# Patient Record
Sex: MALE | Race: AMERICAN INDIAN OR ALASKA NATIVE | NOT HISPANIC OR LATINO | Employment: UNEMPLOYED | URBAN - METROPOLITAN AREA
[De-identification: names, ages, dates, MRNs, and addresses within clinical notes are randomized per-mention and may not be internally consistent; named-entity substitution may affect disease eponyms.]

---

## 2018-12-05 ENCOUNTER — APPOINTMENT (OUTPATIENT)
Dept: RADIOLOGY | Facility: MEDICAL CENTER | Age: 33
End: 2018-12-05
Attending: EMERGENCY MEDICINE
Payer: COMMERCIAL

## 2018-12-05 ENCOUNTER — HOSPITAL ENCOUNTER (EMERGENCY)
Facility: MEDICAL CENTER | Age: 33
End: 2018-12-06
Attending: EMERGENCY MEDICINE
Payer: COMMERCIAL

## 2018-12-05 DIAGNOSIS — F10.920 ALCOHOLIC INTOXICATION WITHOUT COMPLICATION (HCC): ICD-10-CM

## 2018-12-05 DIAGNOSIS — R51.9 NONINTRACTABLE HEADACHE, UNSPECIFIED CHRONICITY PATTERN, UNSPECIFIED HEADACHE TYPE: ICD-10-CM

## 2018-12-05 LAB
BASOPHILS # BLD AUTO: 0.6 % (ref 0–1.8)
BASOPHILS # BLD: 0.04 K/UL (ref 0–0.12)
EOSINOPHIL # BLD AUTO: 0.34 K/UL (ref 0–0.51)
EOSINOPHIL NFR BLD: 5.4 % (ref 0–6.9)
ERYTHROCYTE [DISTWIDTH] IN BLOOD BY AUTOMATED COUNT: 43.2 FL (ref 35.9–50)
HCT VFR BLD AUTO: 44.9 % (ref 42–52)
HGB BLD-MCNC: 15.5 G/DL (ref 14–18)
IMM GRANULOCYTES # BLD AUTO: 0.02 K/UL (ref 0–0.11)
IMM GRANULOCYTES NFR BLD AUTO: 0.3 % (ref 0–0.9)
LYMPHOCYTES # BLD AUTO: 1.89 K/UL (ref 1–4.8)
LYMPHOCYTES NFR BLD: 29.8 % (ref 22–41)
MCH RBC QN AUTO: 31.1 PG (ref 27–33)
MCHC RBC AUTO-ENTMCNC: 34.5 G/DL (ref 33.7–35.3)
MCV RBC AUTO: 90 FL (ref 81.4–97.8)
MONOCYTES # BLD AUTO: 0.54 K/UL (ref 0–0.85)
MONOCYTES NFR BLD AUTO: 8.5 % (ref 0–13.4)
NEUTROPHILS # BLD AUTO: 3.51 K/UL (ref 1.82–7.42)
NEUTROPHILS NFR BLD: 55.4 % (ref 44–72)
NRBC # BLD AUTO: 0 K/UL
NRBC BLD-RTO: 0 /100 WBC
PLATELET # BLD AUTO: 254 K/UL (ref 164–446)
PMV BLD AUTO: 10.5 FL (ref 9–12.9)
RBC # BLD AUTO: 4.99 M/UL (ref 4.7–6.1)
WBC # BLD AUTO: 6.3 K/UL (ref 4.8–10.8)

## 2018-12-05 PROCEDURE — 83690 ASSAY OF LIPASE: CPT

## 2018-12-05 PROCEDURE — 80053 COMPREHEN METABOLIC PANEL: CPT

## 2018-12-05 PROCEDURE — 99285 EMERGENCY DEPT VISIT HI MDM: CPT

## 2018-12-05 PROCEDURE — 85025 COMPLETE CBC W/AUTO DIFF WBC: CPT

## 2018-12-05 RX ORDER — ONDANSETRON 2 MG/ML
4 INJECTION INTRAMUSCULAR; INTRAVENOUS ONCE
Status: COMPLETED | OUTPATIENT
Start: 2018-12-06 | End: 2018-12-06

## 2018-12-06 VITALS — BODY MASS INDEX: 41.75 KG/M2 | WEIGHT: 315 LBS | RESPIRATION RATE: 25 BRPM | TEMPERATURE: 99.5 F | HEIGHT: 73 IN

## 2018-12-06 LAB
ALBUMIN SERPL BCP-MCNC: 4.1 G/DL (ref 3.2–4.9)
ALBUMIN/GLOB SERPL: 1.2 G/DL
ALP SERPL-CCNC: 89 U/L (ref 30–99)
ALT SERPL-CCNC: 26 U/L (ref 2–50)
ANION GAP SERPL CALC-SCNC: 10 MMOL/L (ref 0–11.9)
AST SERPL-CCNC: 17 U/L (ref 12–45)
BILIRUB SERPL-MCNC: 0.3 MG/DL (ref 0.1–1.5)
BUN SERPL-MCNC: 9 MG/DL (ref 8–22)
CALCIUM SERPL-MCNC: 8.6 MG/DL (ref 8.5–10.5)
CHLORIDE SERPL-SCNC: 112 MMOL/L (ref 96–112)
CO2 SERPL-SCNC: 22 MMOL/L (ref 20–33)
CREAT SERPL-MCNC: 0.64 MG/DL (ref 0.5–1.4)
GLOBULIN SER CALC-MCNC: 3.4 G/DL (ref 1.9–3.5)
GLUCOSE SERPL-MCNC: 120 MG/DL (ref 65–99)
LIPASE SERPL-CCNC: 14 U/L (ref 11–82)
POTASSIUM SERPL-SCNC: 3.8 MMOL/L (ref 3.6–5.5)
PROT SERPL-MCNC: 7.5 G/DL (ref 6–8.2)
SODIUM SERPL-SCNC: 144 MMOL/L (ref 135–145)

## 2018-12-06 PROCEDURE — 700102 HCHG RX REV CODE 250 W/ 637 OVERRIDE(OP): Performed by: EMERGENCY MEDICINE

## 2018-12-06 PROCEDURE — 96374 THER/PROPH/DIAG INJ IV PUSH: CPT

## 2018-12-06 PROCEDURE — 700111 HCHG RX REV CODE 636 W/ 250 OVERRIDE (IP): Performed by: EMERGENCY MEDICINE

## 2018-12-06 PROCEDURE — A9270 NON-COVERED ITEM OR SERVICE: HCPCS | Performed by: EMERGENCY MEDICINE

## 2018-12-06 PROCEDURE — 70450 CT HEAD/BRAIN W/O DYE: CPT

## 2018-12-06 RX ORDER — ONDANSETRON 4 MG/1
4 TABLET, ORALLY DISINTEGRATING ORAL EVERY 6 HOURS PRN
Qty: 20 TAB | Refills: 0 | Status: SHIPPED | OUTPATIENT
Start: 2018-12-06 | End: 2021-07-07

## 2018-12-06 RX ORDER — ACETAMINOPHEN 325 MG/1
650 TABLET ORAL ONCE
Status: COMPLETED | OUTPATIENT
Start: 2018-12-06 | End: 2018-12-06

## 2018-12-06 RX ADMIN — ONDANSETRON 4 MG: 2 INJECTION INTRAMUSCULAR; INTRAVENOUS at 00:21

## 2018-12-06 RX ADMIN — ACETAMINOPHEN 650 MG: 325 TABLET, FILM COATED ORAL at 01:50

## 2018-12-06 ASSESSMENT — LIFESTYLE VARIABLES
TOTAL SCORE: 0
CONSUMPTION TOTAL: POSITIVE
TOTAL SCORE: 0
ON A TYPICAL DAY WHEN YOU DRINK ALCOHOL HOW MANY DRINKS DO YOU HAVE: 20
TOTAL SCORE: 0
HAVE PEOPLE ANNOYED YOU BY CRITICIZING YOUR DRINKING: NO
HOW MANY TIMES IN THE PAST YEAR HAVE YOU HAD 5 OR MORE DRINKS IN A DAY: 365
AVERAGE NUMBER OF DAYS PER WEEK YOU HAVE A DRINK CONTAINING ALCOHOL: 7
EVER HAD A DRINK FIRST THING IN THE MORNING TO STEADY YOUR NERVES TO GET RID OF A HANGOVER: NO
EVER FELT BAD OR GUILTY ABOUT YOUR DRINKING: NO
HAVE YOU EVER FELT YOU SHOULD CUT DOWN ON YOUR DRINKING: NO
DO YOU DRINK ALCOHOL: YES

## 2018-12-06 NOTE — ED TRIAGE NOTES
Chief Complaint   Patient presents with   • Loss of Consciousness     pt bib ems from home pt was found down by his family for no longer than 5 minutes. Pt complains of severe headache. Pt states he has never had a headache before. Pt endorses drinking dwayne 20 beers today since 1100hrs this morning. Pt is 0.154 on breathalyzer. FSBG 146   • Headache   • Alcohol Intoxication

## 2018-12-06 NOTE — ED NOTES
Pt Given discharge instructions/ prescriptions/ home care instructions, Pt verbalized understanding of instructions given, pt ambulatory to ER lobby with friends. IV removed. Dressing placed, No bleeding noted.

## 2018-12-06 NOTE — ED NOTES
Pt complaining of being thirsty. Pt educated that he could not have anything by mouth until CT was complete. Pt verbalized understanding.

## 2018-12-06 NOTE — ED NOTES
Pt resting comfortably in bed. Monitors in place VSS. No s/s of distress noted. Will continue to monitor.

## 2018-12-06 NOTE — ED PROVIDER NOTES
ED Provider Note    ER PROVIDER NOTE        CHIEF COMPLAINT  Chief Complaint   Patient presents with   • Loss of Consciousness     pt bib ems from home pt was found down by his family for no longer than 5 minutes. Pt complains of severe headache. Pt states he has never had a headache before. Pt endorses drinking dwayne 20 beers today since 1100hrs this morning. Pt is 0.154 on breathalyzer. FSBG 146   • Headache   • Alcohol Intoxication       HPI  Nik Wasserman is a 33 y.o. male who presents to the emergency department complaining of headache.  Patient reports he has been drinking since this morning, approximately 1 hour ago he developed severe headache.  States he has not had a headache like this before, seem to be fairly abrupt in onset.  Through the evening he has had some nausea with an upper as well, denies any abdominal pain.  No chest pain.  No fevers or chills or neck pain.  He denies any focal weakness numbness or tingling.  He denies any visual symptoms.  Apparently earlier in the evening his family found as well    REVIEW OF SYSTEMS  Pertinent positives include headache. Pertinent negatives include no neck pain. See HPI for details. All other systems reviewed and are negative.    PAST MEDICAL HISTORY   None    SURGICAL HISTORY  patient denies any surgical history    FAMILY HISTORY  No family history on file.    SOCIAL HISTORY  Social History     Social History   • Marital status: N/A     Spouse name: N/A   • Number of children: N/A   • Years of education: N/A     Social History Main Topics   • Smoking status: Not on file   • Smokeless tobacco: Not on file   • Alcohol use Not on file   • Drug use: Unknown   • Sexual activity: Not on file     Other Topics Concern   • Not on file     Social History Narrative   • No narrative on file      History   Drug use: Unknown       CURRENT MEDICATIONS  Home Medications    **Home medications have not yet been reviewed for this encounter**         ALLERGIES  No Known  "Allergies    PHYSICAL EXAM  VITAL SIGNS: Temp 37.5 °C (99.5 °F) (Temporal)   Ht 1.854 m (6' 1\")   Wt (!) 154.2 kg (340 lb)   BMI 44.86 kg/m²   Pulse ox interpretation: I interpret this pulse ox as normal.    Constitutional: Alert in no apparent distress, smells of alcohol, unkempt  HENT: No signs of trauma, Bilateral external ears normal, Nose normal.   Eyes: Pupils are equal and reactive, Conjunctiva normal, Non-icteric.   Neck: Normal range of motion, No tenderness, Supple, No stridor.   Lymphatic: No lymphadenopathy noted.   Cardiovascular: Regular rate and rhythm, no murmurs.   Thorax & Lungs: Normal breath sounds, No respiratory distress, No wheezing, No chest tenderness.   Abdomen: Bowel sounds normal, Soft, No tenderness, No masses, No pulsatile masses. No peritoneal signs.  Skin: Warm, Dry, No erythema, No rash.   Back: No bony tenderness, No CVA tenderness.   Extremities: Intact distal pulses, No edema, No tenderness, No cyanosis, Negative Mindi's sign.  Musculoskeletal: Good range of motion in all major joints. No tenderness to palpation or major deformities noted.   Neurologic: Alert, bilateral horizontal nystagmus, slurred speech, cranial nerves intact,upper extremities bilaterally exhibit no drift, no dysmetria, 5 out of 5 strength with bilateral bicep/tricep/, sensation intact to light touch throughout upper extremities. Lower extremities strength 5 out of 5 thigh extension/flexion/abduction/adduction, knee extension/flexion, dorsiflexion plantar flexion. No clonus.  2+ patella reflexes.  sensation intact to light touch.  No focal deficits noted.Psychiatric: Affect normal, Judgment normal, Mood normal.       DIAGNOSTIC STUDIES / PROCEDURES        LABS  Labs Reviewed   COMP METABOLIC PANEL - Abnormal; Notable for the following:        Result Value    Glucose 120 (*)     All other components within normal limits   CBC WITH DIFFERENTIAL   LIPASE   ESTIMATED GFR       All labs reviewed by " me.    RADIOLOGY  CT-HEAD W/O   Final Result      No evidence of acute intracranial process.        The radiologist's interpretation of all radiological studies have been reviewed by me.    COURSE & MEDICAL DECISION MAKING  Nursing notes, VS, PMSFHx reviewed in chart.    11:21 PM Patient seen and examined at bedside. Patient will be treated with Zofran. Ordered for cbc, cmp, lipase, CT to evaluate his symptoms.     2:09 AM  Patient reevaluated, feeling improved.  Tolerating p.o. well, and relates with steady gait, plan for discharge            Decision Making:  This is a 33 y.o. male presenting with headache in the setting of alcohol use.  At this time I detect no emergent process.  As patient did have abrupt onset of his headache, CT scan was obtained and there is no evidence of subarachnoid or other intracranial bleed.  Given that onset of symptoms less than 6 hours with a negative CT essentially ruling out this diagnosis. the normal vital signs, lack of a temperature and lack of meningeal signs (supple neck without pain with rom) makes the dx of meningitis less likely.   The patient has no neurologic signs, no fever, and is immunocompetent therefore the time course would be inconsistent with abscess. The absence of neurologic signs and the short duration of sx make neoplasm unlikely.   The patient is improved with normal vitals, a normal neurologic exam, normal gait, and is discharged home with pcp follow-up and return precautions.      The patient will return for new or worsening symptoms and is stable at the time of discharge.    The patient is referred to a primary physician for blood pressure management, diabetic screening, and for all other preventative health concerns.        DISPOSITION:  Patient will be discharged home in stable condition.    FOLLOW UP:  [address] [address]  [phone]          OUTPATIENT MEDICATIONS:  New Prescriptions    ONDANSETRON (ZOFRAN  ODT) 4 MG TABLET DISPERSIBLE    Take 1 Tab by mouth every 6 hours as needed for Nausea.         FINAL IMPRESSION  1. Alcoholic intoxication without complication (HCC)    2. Nonintractable headache, unspecified chronicity pattern, unspecified headache type          The note accurately reflects work and decisions made by me.  Hima Kaminski  12/6/2018  2:11 AM

## 2021-06-26 ENCOUNTER — HOSPITAL ENCOUNTER (EMERGENCY)
Facility: MEDICAL CENTER | Age: 36
End: 2021-06-26
Attending: EMERGENCY MEDICINE
Payer: COMMERCIAL

## 2021-06-26 VITALS
HEART RATE: 72 BPM | BODY MASS INDEX: 41.75 KG/M2 | OXYGEN SATURATION: 98 % | WEIGHT: 315 LBS | RESPIRATION RATE: 18 BRPM | SYSTOLIC BLOOD PRESSURE: 146 MMHG | HEIGHT: 73 IN | DIASTOLIC BLOOD PRESSURE: 108 MMHG | TEMPERATURE: 96.5 F

## 2021-06-26 DIAGNOSIS — L03.031 CELLULITIS OF TOE OF RIGHT FOOT: ICD-10-CM

## 2021-06-26 DIAGNOSIS — T14.8XXA WOUND INFECTION: ICD-10-CM

## 2021-06-26 DIAGNOSIS — L08.9 WOUND INFECTION: ICD-10-CM

## 2021-06-26 LAB — GLUCOSE BLD-MCNC: 87 MG/DL (ref 65–99)

## 2021-06-26 PROCEDURE — 99283 EMERGENCY DEPT VISIT LOW MDM: CPT

## 2021-06-26 PROCEDURE — 82962 GLUCOSE BLOOD TEST: CPT

## 2021-06-26 RX ORDER — SULFAMETHOXAZOLE AND TRIMETHOPRIM 800; 160 MG/1; MG/1
1 TABLET ORAL 2 TIMES DAILY
Qty: 20 TABLET | Refills: 0 | Status: SHIPPED | OUTPATIENT
Start: 2021-06-26 | End: 2021-07-06

## 2021-06-26 NOTE — ED NOTES
Discharge instructions and prescriptions discussed with pt. Pt verbalized understanding. Pt discharged ambulatory with family.

## 2021-06-26 NOTE — ED PROVIDER NOTES
ED Provider Note    CHIEF COMPLAINT  Chief Complaint   Patient presents with   • Wound Check     pt states having foot sores that started 4 days ago. present on pts bilateral feet. sores are dry and look like they are healing.        HPI  Nik Wasserman is a 35 y.o. male who presents for evaluation of wound check on the bilateral feet.  Patient has no stated medical or surgical history.  He is not a known diabetic.  He developed some open sores on his feet around 4 to 5 days ago.  Some of them are oozing some slightly purulent drainage.  No associated trauma no fevers or chills.  No other symptoms reported    REVIEW OF SYSTEMS  See HPI for further details.  No night sweats weight loss numbness tingling weakness rash no stated medical history all other systems are negative.     PAST MEDICAL HISTORY  No past medical history on file.  No stated medical history  FAMILY HISTORY  Noncontributory    SOCIAL HISTORY  Social History     Socioeconomic History   • Marital status: Single     Spouse name: Not on file   • Number of children: Not on file   • Years of education: Not on file   • Highest education level: Not on file   Occupational History   • Not on file   Tobacco Use   • Smoking status: Never Smoker   • Smokeless tobacco: Never Used   Vaping Use   • Vaping Use: Some days   • Substances: smokes wax?   Substance and Sexual Activity   • Alcohol use: Yes   • Drug use: Yes     Comment: pt smoked meth yesterday   • Sexual activity: Not on file   Other Topics Concern   • Not on file   Social History Narrative   • Not on file     Social Determinants of Health     Financial Resource Strain:    • Difficulty of Paying Living Expenses:    Food Insecurity:    • Worried About Running Out of Food in the Last Year:    • Ran Out of Food in the Last Year:    Transportation Needs:    • Lack of Transportation (Medical):    • Lack of Transportation (Non-Medical):    Physical Activity:    • Days of Exercise per Week:    • Minutes of  "Exercise per Session:    Stress:    • Feeling of Stress :    Social Connections:    • Frequency of Communication with Friends and Family:    • Frequency of Social Gatherings with Friends and Family:    • Attends [employer] Services:    • Active Member of Clubs or Organizations:    • Attends Club or Organization Meetings:    • Marital Status:    Intimate Partner Violence:    • Fear of Current or Ex-Partner:    • Emotionally Abused:    • Physically Abused:    • Sexually Abused:        SURGICAL HISTORY  No past surgical history on file.    CURRENT MEDICATIONS  Home Medications     Reviewed by Ginette Reese R.N. (Registered Nurse) on 06/26/21 at 1343  Med List Status: Complete   Medication Last Dose Status   ondansetron (ZOFRAN ODT) 4 MG TABLET DISPERSIBLE  Active                ALLERGIES  No Known Allergies    PHYSICAL EXAM  VITAL SIGNS: /108   Pulse 72   Temp 35.8 °C (96.5 °F) (Temporal)   Resp 18   Ht 1.854 m (6' 1\")   Wt (!) 166 kg (365 lb 1.3 oz)   SpO2 98%   BMI 48.17 kg/m²       Constitutional: Well developed, Well nourished, No acute distress, Non-toxic appearance.   HENT: Normocephalic, Atraumatic, Bilateral external ears normal, Oropharynx moist, No oral exudates, Nose normal.   Eyes: PERRLA, EOMI, Conjunctiva normal, No discharge.   Neck: Normal range of motion, No tenderness, Supple, No stridor.   Cardiovascular: Normal heart rate, Normal rhythm, No murmurs, No rubs, No gallops.   Thorax & Lungs: Normal breath sounds, No respiratory distress, No wheezing, No chest tenderness.   Abdomen: Bowel sounds normal, Soft, No tenderness, No masses, No pulsatile masses.   Extremities: I patient has some open sores on the bilateral feet primarily between the first and second toes in the webspace on both sides.  There is no abscess.  No necrosis   neurologic: Alert & oriented x 3, Normal motor function, Normal sensory function, No focal deficits noted.   Psychiatric: Affect normal, Judgment normal, Mood " normal.     Accu-Chek is normal at 87    COURSE & MEDICAL DECISION MAKING  Pertinent Labs & Imaging studies reviewed. (See chart for details)  Patient presents here with some open wounds that do appear to have some superficial infection.  I will provide him with several packets of possible antibiotic ointment and I counseled him to clean his feet at least once or twice a day pat dry with a clean towel apply antibiotic ointment and then put clean cotton socks over them.  I will start him on a 10-day course of Bactrim as well    FINAL IMPRESSION  1.  Bilateral foot infections         Electronically signed by: Ye Tellez M.D., 6/26/2021 2:42 PM

## 2021-06-26 NOTE — ED TRIAGE NOTES
"Chief Complaint   Patient presents with   • Wound Check     pt states having foot sores that started 4 days ago. present on pts bilateral feet. sores are dry and look like they are healing.        Pt BIB EMS from [facility] where pt couldn't be seen due to insurance. Pt states he has a mouth sore as well, but does not remember when it started. Pt aox4, GCS 15, speaking full sentences. Educated pt on triage process and to notify if there is any change.       /108   Pulse 72   Temp 35.8 °C (96.5 °F) (Temporal)   Resp 18   Ht 1.854 m (6' 1\")   Wt (!) 166 kg (365 lb 1.3 oz)   SpO2 98%   BMI 48.17 kg/m²     "

## 2021-07-07 ENCOUNTER — APPOINTMENT (OUTPATIENT)
Dept: RADIOLOGY | Facility: MEDICAL CENTER | Age: 36
DRG: 917 | End: 2021-07-07
Attending: EMERGENCY MEDICINE
Payer: COMMERCIAL

## 2021-07-07 ENCOUNTER — HOSPITAL ENCOUNTER (INPATIENT)
Facility: MEDICAL CENTER | Age: 36
LOS: 3 days | DRG: 917 | End: 2021-07-11
Attending: EMERGENCY MEDICINE | Admitting: STUDENT IN AN ORGANIZED HEALTH CARE EDUCATION/TRAINING PROGRAM
Payer: COMMERCIAL

## 2021-07-07 DIAGNOSIS — R06.00 DYSPNEA, UNSPECIFIED TYPE: ICD-10-CM

## 2021-07-07 DIAGNOSIS — I50.9 ACUTE CONGESTIVE HEART FAILURE, UNSPECIFIED HEART FAILURE TYPE (HCC): ICD-10-CM

## 2021-07-07 LAB
ANION GAP SERPL CALC-SCNC: 11 MMOL/L (ref 7–16)
BASE EXCESS BLDV CALC-SCNC: -8 MMOL/L
BASOPHILS # BLD AUTO: 0.4 % (ref 0–1.8)
BASOPHILS # BLD: 0.05 K/UL (ref 0–0.12)
BODY TEMPERATURE: ABNORMAL CENTIGRADE
BUN SERPL-MCNC: 35 MG/DL (ref 8–22)
CALCIUM SERPL-MCNC: 8.4 MG/DL (ref 8.5–10.5)
CHLORIDE SERPL-SCNC: 112 MMOL/L (ref 96–112)
CO2 SERPL-SCNC: 17 MMOL/L (ref 20–33)
CREAT SERPL-MCNC: 1.87 MG/DL (ref 0.5–1.4)
EKG IMPRESSION: NORMAL
EOSINOPHIL # BLD AUTO: 0.03 K/UL (ref 0–0.51)
EOSINOPHIL NFR BLD: 0.2 % (ref 0–6.9)
ERYTHROCYTE [DISTWIDTH] IN BLOOD BY AUTOMATED COUNT: 44.5 FL (ref 35.9–50)
GLUCOSE SERPL-MCNC: 134 MG/DL (ref 65–99)
HCO3 BLDV-SCNC: 17 MMOL/L (ref 24–28)
HCT VFR BLD AUTO: 38.7 % (ref 42–52)
HGB BLD-MCNC: 12.3 G/DL (ref 14–18)
IMM GRANULOCYTES # BLD AUTO: 0.06 K/UL (ref 0–0.11)
IMM GRANULOCYTES NFR BLD AUTO: 0.5 % (ref 0–0.9)
LYMPHOCYTES # BLD AUTO: 0.7 K/UL (ref 1–4.8)
LYMPHOCYTES NFR BLD: 5.5 % (ref 22–41)
MCH RBC QN AUTO: 29.3 PG (ref 27–33)
MCHC RBC AUTO-ENTMCNC: 31.8 G/DL (ref 33.7–35.3)
MCV RBC AUTO: 92.1 FL (ref 81.4–97.8)
MONOCYTES # BLD AUTO: 0.29 K/UL (ref 0–0.85)
MONOCYTES NFR BLD AUTO: 2.3 % (ref 0–13.4)
NEUTROPHILS # BLD AUTO: 11.53 K/UL (ref 1.82–7.42)
NEUTROPHILS NFR BLD: 91.1 % (ref 44–72)
NRBC # BLD AUTO: 0 K/UL
NRBC BLD-RTO: 0 /100 WBC
NT-PROBNP SERPL IA-MCNC: 9418 PG/ML (ref 0–125)
PCO2 BLDV: 34.7 MMHG (ref 41–51)
PH BLDV: 7.32 [PH] (ref 7.31–7.45)
PLATELET # BLD AUTO: 276 K/UL (ref 164–446)
PMV BLD AUTO: 10.5 FL (ref 9–12.9)
PO2 BLDV: 72.8 MMHG (ref 25–40)
POTASSIUM SERPL-SCNC: 4.7 MMOL/L (ref 3.6–5.5)
RBC # BLD AUTO: 4.2 M/UL (ref 4.7–6.1)
SAO2 % BLDV: 93.1 %
SODIUM SERPL-SCNC: 140 MMOL/L (ref 135–145)
TROPONIN T SERPL-MCNC: 38 NG/L (ref 6–19)
WBC # BLD AUTO: 12.7 K/UL (ref 4.8–10.8)

## 2021-07-07 PROCEDURE — 84484 ASSAY OF TROPONIN QUANT: CPT

## 2021-07-07 PROCEDURE — 83880 ASSAY OF NATRIURETIC PEPTIDE: CPT

## 2021-07-07 PROCEDURE — 36415 COLL VENOUS BLD VENIPUNCTURE: CPT

## 2021-07-07 PROCEDURE — 93005 ELECTROCARDIOGRAM TRACING: CPT | Performed by: EMERGENCY MEDICINE

## 2021-07-07 PROCEDURE — 71045 X-RAY EXAM CHEST 1 VIEW: CPT

## 2021-07-07 PROCEDURE — 5A09357 ASSISTANCE WITH RESPIRATORY VENTILATION, LESS THAN 24 CONSECUTIVE HOURS, CONTINUOUS POSITIVE AIRWAY PRESSURE: ICD-10-PCS | Performed by: EMERGENCY MEDICINE

## 2021-07-07 PROCEDURE — 700117 HCHG RX CONTRAST REV CODE 255: Performed by: EMERGENCY MEDICINE

## 2021-07-07 PROCEDURE — 80048 BASIC METABOLIC PNL TOTAL CA: CPT

## 2021-07-07 PROCEDURE — 85025 COMPLETE CBC W/AUTO DIFF WBC: CPT

## 2021-07-07 PROCEDURE — 82803 BLOOD GASES ANY COMBINATION: CPT

## 2021-07-07 PROCEDURE — 96374 THER/PROPH/DIAG INJ IV PUSH: CPT

## 2021-07-07 PROCEDURE — 94660 CPAP INITIATION&MGMT: CPT

## 2021-07-07 PROCEDURE — 99285 EMERGENCY DEPT VISIT HI MDM: CPT

## 2021-07-07 PROCEDURE — 96372 THER/PROPH/DIAG INJ SC/IM: CPT

## 2021-07-07 PROCEDURE — 96375 TX/PRO/DX INJ NEW DRUG ADDON: CPT

## 2021-07-07 PROCEDURE — 71275 CT ANGIOGRAPHY CHEST: CPT

## 2021-07-07 RX ORDER — ACETAMINOPHEN 500 MG
500-1000 TABLET ORAL EVERY 6 HOURS PRN
COMMUNITY

## 2021-07-07 RX ORDER — IPRATROPIUM BROMIDE AND ALBUTEROL SULFATE 2.5; .5 MG/3ML; MG/3ML
3 SOLUTION RESPIRATORY (INHALATION)
Status: DISCONTINUED | OUTPATIENT
Start: 2021-07-07 | End: 2021-07-11 | Stop reason: HOSPADM

## 2021-07-07 RX ORDER — SULFAMETHOXAZOLE AND TRIMETHOPRIM 800; 160 MG/1; MG/1
1 TABLET ORAL 2 TIMES DAILY
Status: ON HOLD | COMMUNITY
End: 2021-07-11

## 2021-07-07 RX ADMIN — IOHEXOL 50 ML: 350 INJECTION, SOLUTION INTRAVENOUS at 23:30

## 2021-07-07 ASSESSMENT — PAIN SCALES - WONG BAKER: WONGBAKER_NUMERICALRESPONSE: DOESN'T HURT AT ALL

## 2021-07-07 ASSESSMENT — PULMONARY FUNCTION TESTS: EPAP_CMH2O: 4

## 2021-07-08 ENCOUNTER — APPOINTMENT (OUTPATIENT)
Dept: RADIOLOGY | Facility: MEDICAL CENTER | Age: 36
DRG: 917 | End: 2021-07-08
Attending: STUDENT IN AN ORGANIZED HEALTH CARE EDUCATION/TRAINING PROGRAM
Payer: COMMERCIAL

## 2021-07-08 ENCOUNTER — HOSPITAL ENCOUNTER (OUTPATIENT)
Dept: RADIOLOGY | Facility: MEDICAL CENTER | Age: 36
End: 2021-07-08

## 2021-07-08 PROBLEM — I10 ESSENTIAL HYPERTENSION: Status: ACTIVE | Noted: 2021-07-08

## 2021-07-08 PROBLEM — R79.89 ELEVATED TROPONIN: Status: ACTIVE | Noted: 2021-07-08

## 2021-07-08 PROBLEM — R73.9 HYPERGLYCEMIA: Status: ACTIVE | Noted: 2021-07-08

## 2021-07-08 PROBLEM — J96.00 ACUTE RESPIRATORY FAILURE (HCC): Status: ACTIVE | Noted: 2021-07-08

## 2021-07-08 PROBLEM — N17.9 AKI (ACUTE KIDNEY INJURY) (HCC): Status: ACTIVE | Noted: 2021-07-08

## 2021-07-08 PROBLEM — R79.89 ELEVATED BRAIN NATRIURETIC PEPTIDE (BNP) LEVEL: Status: ACTIVE | Noted: 2021-07-08

## 2021-07-08 PROBLEM — D72.829 LEUKOCYTOSIS: Status: ACTIVE | Noted: 2021-07-08

## 2021-07-08 LAB
APPEARANCE UR: ABNORMAL
BACTERIA #/AREA URNS HPF: NEGATIVE /HPF
BILIRUB UR QL STRIP.AUTO: NEGATIVE
COLOR UR: YELLOW
EPI CELLS #/AREA URNS HPF: NEGATIVE /HPF
EST. AVERAGE GLUCOSE BLD GHB EST-MCNC: 117 MG/DL
GLUCOSE BLD-MCNC: 116 MG/DL (ref 65–99)
GLUCOSE BLD-MCNC: 144 MG/DL (ref 65–99)
GLUCOSE BLD-MCNC: 93 MG/DL (ref 65–99)
GLUCOSE UR STRIP.AUTO-MCNC: NEGATIVE MG/DL
HBA1C MFR BLD: 5.7 % (ref 4–5.6)
HYALINE CASTS #/AREA URNS LPF: ABNORMAL /LPF
KETONES UR STRIP.AUTO-MCNC: NEGATIVE MG/DL
LEUKOCYTE ESTERASE UR QL STRIP.AUTO: ABNORMAL
MICRO URNS: ABNORMAL
NITRITE UR QL STRIP.AUTO: NEGATIVE
PH UR STRIP.AUTO: 5 [PH] (ref 5–8)
PROCALCITONIN SERPL-MCNC: <0.05 NG/ML
PROT UR QL STRIP: 300 MG/DL
RBC # URNS HPF: ABNORMAL /HPF
RBC UR QL AUTO: ABNORMAL
SP GR UR STRIP.AUTO: 1.03
TROPONIN T SERPL-MCNC: 29 NG/L (ref 6–19)
UROBILINOGEN UR STRIP.AUTO-MCNC: 1 MG/DL
WBC #/AREA URNS HPF: ABNORMAL /HPF

## 2021-07-08 PROCEDURE — 700101 HCHG RX REV CODE 250: Performed by: STUDENT IN AN ORGANIZED HEALTH CARE EDUCATION/TRAINING PROGRAM

## 2021-07-08 PROCEDURE — 82962 GLUCOSE BLOOD TEST: CPT | Mod: 91

## 2021-07-08 PROCEDURE — 83036 HEMOGLOBIN GLYCOSYLATED A1C: CPT

## 2021-07-08 PROCEDURE — 84145 PROCALCITONIN (PCT): CPT

## 2021-07-08 PROCEDURE — 81001 URINALYSIS AUTO W/SCOPE: CPT

## 2021-07-08 PROCEDURE — 700111 HCHG RX REV CODE 636 W/ 250 OVERRIDE (IP): Performed by: STUDENT IN AN ORGANIZED HEALTH CARE EDUCATION/TRAINING PROGRAM

## 2021-07-08 PROCEDURE — 84484 ASSAY OF TROPONIN QUANT: CPT

## 2021-07-08 PROCEDURE — 770020 HCHG ROOM/CARE - TELE (206)

## 2021-07-08 PROCEDURE — A9270 NON-COVERED ITEM OR SERVICE: HCPCS | Performed by: STUDENT IN AN ORGANIZED HEALTH CARE EDUCATION/TRAINING PROGRAM

## 2021-07-08 PROCEDURE — 76775 US EXAM ABDO BACK WALL LIM: CPT

## 2021-07-08 PROCEDURE — 94640 AIRWAY INHALATION TREATMENT: CPT

## 2021-07-08 PROCEDURE — 99223 1ST HOSP IP/OBS HIGH 75: CPT | Performed by: STUDENT IN AN ORGANIZED HEALTH CARE EDUCATION/TRAINING PROGRAM

## 2021-07-08 PROCEDURE — 700102 HCHG RX REV CODE 250 W/ 637 OVERRIDE(OP): Performed by: STUDENT IN AN ORGANIZED HEALTH CARE EDUCATION/TRAINING PROGRAM

## 2021-07-08 RX ORDER — LABETALOL HYDROCHLORIDE 5 MG/ML
10 INJECTION, SOLUTION INTRAVENOUS EVERY 4 HOURS PRN
Status: DISCONTINUED | OUTPATIENT
Start: 2021-07-08 | End: 2021-07-11 | Stop reason: HOSPADM

## 2021-07-08 RX ORDER — FUROSEMIDE 10 MG/ML
40 INJECTION INTRAMUSCULAR; INTRAVENOUS
Status: DISCONTINUED | OUTPATIENT
Start: 2021-07-09 | End: 2021-07-09

## 2021-07-08 RX ORDER — POLYETHYLENE GLYCOL 3350 17 G/17G
1 POWDER, FOR SOLUTION ORAL
Status: DISCONTINUED | OUTPATIENT
Start: 2021-07-08 | End: 2021-07-11 | Stop reason: HOSPADM

## 2021-07-08 RX ORDER — HEPARIN SODIUM 5000 [USP'U]/ML
5000 INJECTION, SOLUTION INTRAVENOUS; SUBCUTANEOUS EVERY 8 HOURS
Status: DISCONTINUED | OUTPATIENT
Start: 2021-07-08 | End: 2021-07-11 | Stop reason: HOSPADM

## 2021-07-08 RX ORDER — ONDANSETRON 4 MG/1
4 TABLET, ORALLY DISINTEGRATING ORAL EVERY 4 HOURS PRN
Status: DISCONTINUED | OUTPATIENT
Start: 2021-07-08 | End: 2021-07-11 | Stop reason: HOSPADM

## 2021-07-08 RX ORDER — SULFAMETHOXAZOLE AND TRIMETHOPRIM 800; 160 MG/1; MG/1
1 TABLET ORAL 2 TIMES DAILY
Status: DISCONTINUED | OUTPATIENT
Start: 2021-07-08 | End: 2021-07-08

## 2021-07-08 RX ORDER — ONDANSETRON 2 MG/ML
4 INJECTION INTRAMUSCULAR; INTRAVENOUS EVERY 4 HOURS PRN
Status: DISCONTINUED | OUTPATIENT
Start: 2021-07-08 | End: 2021-07-11 | Stop reason: HOSPADM

## 2021-07-08 RX ORDER — DEXTROSE MONOHYDRATE 25 G/50ML
50 INJECTION, SOLUTION INTRAVENOUS
Status: DISCONTINUED | OUTPATIENT
Start: 2021-07-08 | End: 2021-07-11 | Stop reason: HOSPADM

## 2021-07-08 RX ORDER — METHYLPREDNISOLONE SODIUM SUCCINATE 125 MG/2ML
62.5 INJECTION, POWDER, LYOPHILIZED, FOR SOLUTION INTRAMUSCULAR; INTRAVENOUS EVERY 8 HOURS
Status: COMPLETED | OUTPATIENT
Start: 2021-07-08 | End: 2021-07-10

## 2021-07-08 RX ORDER — PROMETHAZINE HYDROCHLORIDE 25 MG/1
12.5-25 SUPPOSITORY RECTAL EVERY 4 HOURS PRN
Status: DISCONTINUED | OUTPATIENT
Start: 2021-07-08 | End: 2021-07-11 | Stop reason: HOSPADM

## 2021-07-08 RX ORDER — BISACODYL 10 MG
10 SUPPOSITORY, RECTAL RECTAL
Status: DISCONTINUED | OUTPATIENT
Start: 2021-07-08 | End: 2021-07-11 | Stop reason: HOSPADM

## 2021-07-08 RX ORDER — PROCHLORPERAZINE EDISYLATE 5 MG/ML
5-10 INJECTION INTRAMUSCULAR; INTRAVENOUS EVERY 4 HOURS PRN
Status: DISCONTINUED | OUTPATIENT
Start: 2021-07-08 | End: 2021-07-11 | Stop reason: HOSPADM

## 2021-07-08 RX ORDER — INSULIN LISPRO 100 [IU]/ML
1-6 INJECTION, SOLUTION INTRAVENOUS; SUBCUTANEOUS
Status: DISCONTINUED | OUTPATIENT
Start: 2021-07-08 | End: 2021-07-11 | Stop reason: HOSPADM

## 2021-07-08 RX ORDER — FUROSEMIDE 10 MG/ML
40 INJECTION INTRAMUSCULAR; INTRAVENOUS ONCE
Status: COMPLETED | OUTPATIENT
Start: 2021-07-08 | End: 2021-07-08

## 2021-07-08 RX ORDER — ENALAPRILAT 1.25 MG/ML
1.25 INJECTION INTRAVENOUS EVERY 6 HOURS PRN
Status: DISCONTINUED | OUTPATIENT
Start: 2021-07-08 | End: 2021-07-11 | Stop reason: HOSPADM

## 2021-07-08 RX ORDER — PROMETHAZINE HYDROCHLORIDE 25 MG/1
12.5-25 TABLET ORAL EVERY 4 HOURS PRN
Status: DISCONTINUED | OUTPATIENT
Start: 2021-07-08 | End: 2021-07-11 | Stop reason: HOSPADM

## 2021-07-08 RX ORDER — CLONIDINE HYDROCHLORIDE 0.1 MG/1
0.1 TABLET ORAL EVERY 6 HOURS PRN
Status: DISCONTINUED | OUTPATIENT
Start: 2021-07-08 | End: 2021-07-11 | Stop reason: HOSPADM

## 2021-07-08 RX ORDER — LEVALBUTEROL INHALATION SOLUTION 0.63 MG/3ML
0.63 SOLUTION RESPIRATORY (INHALATION)
Status: DISCONTINUED | OUTPATIENT
Start: 2021-07-08 | End: 2021-07-11

## 2021-07-08 RX ORDER — AMOXICILLIN 250 MG
2 CAPSULE ORAL 2 TIMES DAILY
Status: DISCONTINUED | OUTPATIENT
Start: 2021-07-08 | End: 2021-07-11 | Stop reason: HOSPADM

## 2021-07-08 RX ORDER — ACETAMINOPHEN 325 MG/1
650 TABLET ORAL EVERY 6 HOURS PRN
Status: DISCONTINUED | OUTPATIENT
Start: 2021-07-08 | End: 2021-07-11 | Stop reason: HOSPADM

## 2021-07-08 RX ORDER — ALBUTEROL SULFATE 90 UG/1
2 AEROSOL, METERED RESPIRATORY (INHALATION) EVERY 6 HOURS PRN
Status: DISCONTINUED | OUTPATIENT
Start: 2021-07-08 | End: 2021-07-08

## 2021-07-08 RX ADMIN — CLONIDINE HYDROCHLORIDE 0.1 MG: 0.1 TABLET ORAL at 23:45

## 2021-07-08 RX ADMIN — METOPROLOL TARTRATE 12.5 MG: 25 TABLET, FILM COATED ORAL at 18:07

## 2021-07-08 RX ADMIN — METOPROLOL TARTRATE 12.5 MG: 25 TABLET, FILM COATED ORAL at 04:01

## 2021-07-08 RX ADMIN — LEVALBUTEROL HYDROCHLORIDE 0.63 MG: 0.63 SOLUTION RESPIRATORY (INHALATION) at 20:12

## 2021-07-08 RX ADMIN — FUROSEMIDE 40 MG: 10 INJECTION, SOLUTION INTRAMUSCULAR; INTRAVENOUS at 02:30

## 2021-07-08 RX ADMIN — CLONIDINE HYDROCHLORIDE 0.1 MG: 0.1 TABLET ORAL at 23:16

## 2021-07-08 RX ADMIN — HEPARIN SODIUM 5000 UNITS: 5000 INJECTION, SOLUTION INTRAVENOUS; SUBCUTANEOUS at 02:30

## 2021-07-08 RX ADMIN — HEPARIN SODIUM 5000 UNITS: 5000 INJECTION, SOLUTION INTRAVENOUS; SUBCUTANEOUS at 18:08

## 2021-07-08 RX ADMIN — ACETAMINOPHEN 650 MG: 325 TABLET, FILM COATED ORAL at 21:24

## 2021-07-08 RX ADMIN — ENALAPRILAT 1.25 MG: 1.25 INJECTION INTRAVENOUS at 08:10

## 2021-07-08 RX ADMIN — DOCUSATE SODIUM 50 MG AND SENNOSIDES 8.6 MG 2 TABLET: 8.6; 5 TABLET, FILM COATED ORAL at 18:07

## 2021-07-08 RX ADMIN — METHYLPREDNISOLONE SODIUM SUCCINATE 62.5 MG: 125 INJECTION, POWDER, FOR SOLUTION INTRAMUSCULAR; INTRAVENOUS at 18:07

## 2021-07-08 RX ADMIN — FUROSEMIDE 40 MG: 10 INJECTION, SOLUTION INTRAVENOUS at 18:09

## 2021-07-08 ASSESSMENT — ENCOUNTER SYMPTOMS
NAUSEA: 0
DIARRHEA: 0
WEIGHT LOSS: 0
BLURRED VISION: 0
MYALGIAS: 0
WHEEZING: 0
VOMITING: 0
SORE THROAT: 0
LOSS OF CONSCIOUSNESS: 0
CONSTIPATION: 0
HEMOPTYSIS: 0
PALPITATIONS: 0
SHORTNESS OF BREATH: 1
DIZZINESS: 0
ORTHOPNEA: 1
FEVER: 0
CHILLS: 0
ABDOMINAL PAIN: 0
COUGH: 0
WEAKNESS: 0
BLOOD IN STOOL: 0
EYE PAIN: 0

## 2021-07-08 NOTE — ED NOTES
Med Rec completed: per patient at bedside with aunt  Preferred Pharmacy:    *[facility]   *[place], [place]  Allergies:  Allergies   Allergen Reactions   • Penicillins Rash     Reaction: Rash per auntie        No ORAL antibiotics in last 14 days    Home Medications:  Medication Sig Comments   • Albuterol Sulfate 108 (90 Base) MCG/ACT AEROSOL POWDER, BREATH ACTIVATED Inhale 2 Puffs every 6 hours as needed (SOB).    • acetaminophen (TYLENOL) 500 MG Tab Take 500-1,000 mg by mouth every 6 hours as needed.    • sulfamethoxazole-trimethoprim (BACTRIM DS) 800-160 MG tablet Take 1 tablet by mouth 2 times a day. For 10 days     Prescribed 06/26/2021 Did not complete course. Stopped on 7/5/21 with about 4 days left to complete

## 2021-07-08 NOTE — ED NOTES
Attending Hospitalist is Dr Low starting at 0700. Please contact this physician for orders, updates or questions today.

## 2021-07-08 NOTE — ED TRIAGE NOTES
Chief Complaint   Patient presents with   • Shortness of Breath     flight transfer in with bipap     [facility], on bipap found with resp failure. Given solumedrol, lasix, duoneb, 2 grams of mag, placed on a nitro drip which was stopped by flight transport.

## 2021-07-08 NOTE — ED NOTES
Pt to and from CT w/ this RN. VS stable throughout. Per Dr. Scott place pt on oxymask. Pt placed on 10L oxymask and oxygen sats 97%. No other needs at this time.

## 2021-07-08 NOTE — ED NOTES
Pt up to use urinal with sba., call light in place, side rails back up x 2. Medicated per mar, no other needs at this time

## 2021-07-08 NOTE — ED PROVIDER NOTES
ED Provider Note    Scribed for Dilshad Scott M.D. by Herlinda Workman. 7/7/2021,  9:49 PM.    Means of Arrival: EMS  History obtained from: EMS  History limited by: None    CHIEF COMPLAINT  Chief Complaint   Patient presents with    Shortness of Breath     flight transfer in with bipap     [facility]  Nik Wasserman is a 35 y.o. male who presents to the Emergency Department via EMS for evaluation of shortness of breath onset 3 days ago. He reports having chest pain last night. He endorses associated dyspnea. The patient was evaluated at [facility] for his symptoms and was transferred here after he was found with respiratory failure. The patient was placed on bipap. Denies abdominal pain     REVIEW OF SYSTEMS  CARDIOVASCULAR: Chest pain.   RESPIRATORY:  Shortness of breath and dyspnea.   GASTROINTESTINAL:  No abdominal pain.  See HPI for further details.   All other systems are negative.     PAST MEDICAL HISTORY  Past Medical History:   Diagnosis Date    Asthma     Developmental delay     Hypertension        FAMILY HISTORY  History reviewed. No pertinent family history.    SOCIAL HISTORY   reports that he has never smoked. He has never used smokeless tobacco. He reports current alcohol use. He reports current drug use.    SURGICAL HISTORY  History reviewed. No pertinent surgical history.    CURRENT MEDICATIONS  Home Medications       Reviewed by Holley Peter PhT (Pharmacy Tech) on 07/07/21 at 2309  Med List Status: Complete     Medication Last Dose Status   acetaminophen (TYLENOL) 500 MG Tab 7/5/2021 Active   Albuterol Sulfate 108 (90 Base) MCG/ACT AEROSOL POWDER, BREATH ACTIVATED 7/6/2021 Active   sulfamethoxazole-trimethoprim (BACTRIM DS) 800-160 MG tablet 7/5/2021 Active                  ALLERGIES  Allergies   Allergen Reactions    Penicillins Rash     Reaction: Rash per auntie        PHYSICAL EXAM  VITAL SIGNS: BP (!) 182/102   Pulse 95   Temp 36.4 °C (97.6 °F) (Temporal)   Resp (!) 39    "Ht 1.854 m (6' 1\")   Wt (!) 171 kg (377 lb)   SpO2 99%   BMI 49.74 kg/m²    Gen: Alert, no acute distress  HEENT: ATNC  Eyes: PERRL, EOMI, normal conjunctiva.   Neck: trachea midline  Resp: Lung sounds clear,on positive pressure ventilation, increased work of breathing.  CV: No JVD, regular rate and rhythm, no murmurs, rubs, gallops   Abd: non-distended  Ext: No deformities, 1+ pitting edema in bilateral lower extremities, no calf tenderness   Psych: normal mood  Neuro: speech fluent     DIAGNOSTIC STUDIES / PROCEDURES     EKG  Results for orders placed or performed during the hospital encounter of 21   EKG   Result Value Ref Range    Report       [facility] Emergency Dept.    Test Date:  2021  Pt Name:    CHIARA WINSTON              Department: ER  MRN:        [id number]                      Room:       [facility]  Gender:     Male                         Technician:  :        10-                   Requested By:MOLLY SCOTT  Order #:    [id number]                    Reading MD: Molly Scott    Measurements  Intervals                                Axis  Rate:       95                           P:          56  GA:         180                          QRS:        57  QRSD:       90                           T:          62  QT:         368  QTc:        463    Interpretive Statements  SINUS RHYTHM  No previous ECG available for comparison  Electronically Signed On 2021 22:23:16 PDT by Molly Scott        LABS  Labs Reviewed   CBC WITH DIFFERENTIAL - Abnormal; Notable for the following components:       Result Value    WBC 12.7 (*)     RBC 4.20 (*)     Hemoglobin 12.3 (*)     Hematocrit 38.7 (*)     MCHC 31.8 (*)     Neutrophils-Polys 91.10 (*)     Lymphocytes 5.50 (*)     Neutrophils (Absolute) 11.53 (*)     Lymphs (Absolute) 0.70 (*)     All other components within normal limits   BASIC METABOLIC PANEL - Abnormal; Notable for the following components:    Co2 17 (*)     Glucose 134 " (*)     Bun 35 (*)     Creatinine 1.87 (*)     Calcium 8.4 (*)     All other components within normal limits   TROPONIN - Abnormal; Notable for the following components:    Troponin T 38 (*)     All other components within normal limits   PROBRAIN NATRIURETIC PEPTIDE, NT - Abnormal; Notable for the following components:    NT-proBNP 9418 (*)     All other components within normal limits   VENOUS BLOOD GAS - Abnormal; Notable for the following components:    Venous Bg Pco2 34.7 (*)     Venous Bg Po2 72.8 (*)     Venous Bg Hco3 17 (*)     All other components within normal limits   ESTIMATED GFR - Abnormal; Notable for the following components:    GFR If  50 (*)     GFR If Non  41 (*)     All other components within normal limits   TROPONIN - Abnormal; Notable for the following components:    Troponin T 29 (*)     All other components within normal limits   HEMOGLOBIN A1C - Abnormal; Notable for the following components:    Glycohemoglobin 5.7 (*)     All other components within normal limits   PROCALCITONIN   URINALYSIS     All labs reviewed by me.    RADIOLOGY  US-RENAL   Final Result      Normal renal ultrasound.      OUTSIDE IMAGES-DX CHEST   Final Result      CT-CTA CHEST PULMONARY ARTERY W/ RECONS   Final Result      1.  No evidence of pulmonary embolus.      2.  Bibasilar consolidation with trace effusions.            DX-CHEST-PORTABLE (1 VIEW)   Final Result      Enlarged cardiac silhouette with small bilateral pleural effusions and bibasilar consolidation      EC-ECHOCARDIOGRAM COMPLETE W/O CONT    (Results Pending)     The radiologist’s interpretation of all radiology studies have been reviewed by me.    COURSE & MEDICAL DECISION MAKING  Pertinent Labs & Imaging studies reviewed. (See chart for details)    Results from [facility] (Transferring Facility):   Labs  Negative COVID and flu  Creatinine 2.0  otherwise reassuring labs     Chest x-ray impression:  Cardiomegaly with  low lung volumes and mild indistinctness of the interstitium more suggestive of a component of congestion.     9:49 PM - Patient seen and examined at bedside. Patient will be treated with Duoneb nebulizer solution for his symptoms.     11:36 PM - Patient will be treated with Iohexol 350 mg/mL.     12:12 AM - Paged Hospitalist.    12:19 AM - I discussed the patient's case and the above findings with Dr. Clarke (Hospitalist) who agrees to evaluate the patient for hospitalization.     CRITICAL CARE  The very real possibility of a deterioration of this patient's condition required the highest level of my preparedness for sudden, emergent intervention.  I provided critical care services, which included medication orders, frequent reevaluations of the patient's condition and response to treatment, ordering and reviewing test results, and discussing the case with various consultants. The critical care time associated with the care of the patient was 35 minutes. Review chart for interventions. This time is exclusive of any other billable procedures.     Medical Decision Making:  Patient presents as a transfer with acute hypoxemic respiratory failure with increased work of breathing.  At the outside hospital, chest x-ray was concerning for pulmonary edema, which is similar to ours.  The patient had minimal improvement with nitroglycerin drip and became hypotensive on it during transfer, therefore this was not continued.  He has already received Lasix at the outside hospital.  We will also trial DuoNeb.  His lung auscultation does not quite fit the rest of the clinical picture of acute CHF.  The patient underwent CT PE to rule out pulmonary embolism as a cause of his dyspnea, which was reassuring.  The patient was able to be weaned down to simple facemask.  VBG demonstrates no hypercapnia to suggest asthma/COPD as a primary .  proBNP elevated and troponin slightly elevated.,  However EKG nonischemic.  Likely demand  ischemia.  This appears to ultimately fit with heart failure.    I wore a mask and eye protection throughout the encounter.    DISPOSITION:  Patient will be hospitalized by Dr. Clarke in critical condition.    FINAL IMPRESSION  1. Dyspnea, unspecified type    2. Acute congestive heart failure, unspecified heart failure type (HCC)    Total critical care time was 35 minutes, as outlined above.      Herlinda BUI (Scribe), am scribing for, and in the presence of, Dilshad Scott M.D..    Electronically signed by: Herlinda Workman (Scribe), 7/7/2021    IDilshad M.D. personally performed the services described in this documentation, as scribed by Herlinda Workman in my presence, and it is both accurate and complete.    The note accurately reflects work and decisions made by me.  Dilshad Scott M.D.  7/8/2021  3:27 AM    C.     This dictation was created using voice recognition software. The accuracy of the dictation is limited to the abilities of the software. I expect there may be some errors of grammar and possibly content. The nursing notes were reviewed and certain aspects of this information were incorporated into this note.

## 2021-07-08 NOTE — ASSESSMENT & PLAN NOTE
7/9/2021:  Continue with Xopenex breathing treatments, appreciate nephrology recommendations gentle diuresis.  Continue to titrate down oxygen as tolerated.  Empirically place patient on community-acquired antimicrobials including ceftriaxone and doxycycline.  Will reevaluate in the morning.  Continue with incentive spirometer and expiratory challenges.  Pulmonary recommendations appreciated.  7/10/2021:  Continue with Lasix dosing as per nephrology.  Recommendations appreciated.  Patient has improvement respiration.  Continue to follow along.  ____________________________________________________________________  Patient with worsening respiratory distress since Monday  Noted to have bilateral lower extremity edema  BNP elevated 9418 and troponin 38  Denies any prior history of cardiac disease in the past  Is reported to have uncontrolled blood pressure for possibly the past 2 years  - TTE  - Conservative IVF use  - Daily weights, I/O  - Fluid restrict 1200 CC  - Low Na diet  - Lasix 40mg IV x1  -We'll initiate metoprolol 12.5 mg twice daily  -Consider ACEi pending patient response to BB initiation and TTE for confirmation of heart failure  - O2 therapy as needed

## 2021-07-08 NOTE — ASSESSMENT & PLAN NOTE
7/9/2021:  Likely multifactorial patient did receive a one-time dose of Solu-Medrol 125 mg prior to transfer from [facility].  This likely represents demargination subsequently resulting in leukocytosis.  Clinically unclear as to whether there are signs of infection.  Patient does have prominent effusions bilaterally lower lung bases.  We will continue to moderate empirically place patient on community-acquired pneumonia coverage.    procal and UA

## 2021-07-08 NOTE — ED NOTES
Pts father contacted pt to speak with him. Asked this nurse for update. Per pt okay to give his father information about his stay. Educated he would be staying d/t his shortness of breath. Pt father understands POC to admit and would like updates if anything changes. Pt also encouraged to call back at anytime

## 2021-07-08 NOTE — H&P
Hospital Medicine History & Physical Note    Date of Service  7/8/2021    Primary Care Physician  Pcp Pt States None    Consultants  None    Specialist Names: None    Code Status  Full Code    Chief Complaint  Chief Complaint   Patient presents with   • Shortness of Breath     flight transfer in with bipap       History of Presenting Illness  Nik Wasserman is a 35 y.o. male with developmental delay and asthma. Who presented 7/7/2021 as transfer from [facility] where he was seen for shortness of breath since Monday. Patient reports that he was watching fireworks on Monday, and noticed he was having some difficulty breathing. Patient has history of asthma and he thought that it may be due to smoke irritating his lungs. Patient reports that they ignored it and they're hoping that his breathing is improved, but did not. There were no associated fevers, chills, coughing. He did however develop chest pain that started the night prior to his arrival. Additionally, on questioning he reports that he does have shortness of breath when lying flat which has been worsening for the past few days. He currently denies any prior history of cardiac disease in the past. However, per his aunt at bedside, patient was diagnosed with high blood pressure which he believes may have been as long as 2 years ago which he has not been taking medications for. Patient denies further complaints. While at [facility], patient was noted to have persistent hypoxia and required BiPAP therapy. He was additionally noted to have elevated BNP for which she was given Lasix 40 mg IV and was started on nitro drip. Patient symptoms did improve, however, he is developed headaches from the nitro drip at which time it was stopped. Patient was transferred to [facility] where he remained on BiPAP in route. On arrival to [facility] ED, patient was successfully able to be transitioned to 10 L oxygen mask.    [facility] ED: HR 80-1 01, RR 24-42, //102, O2  saturations 90% on 10 L oxygen mask. WBC 12.7, Hb 12.3, CO2 17, BUN 35, CR 1.87, glucose under 34, troponin 38, BNP 9418. CXR performed with bilateral pleural effusions and consolidations bilateral bases. CTA of the pulmonary arteries performed which is unremarkable PE, but again noted bibasilar consolidation with trace effusions. Patient was given DuoNeb's.      I discussed the plan of care with patient and family.    Review of Systems  Review of Systems   Constitutional: Negative for chills, fever and weight loss.   HENT: Negative for hearing loss and sore throat.    Eyes: Negative for blurred vision and pain.   Respiratory: Positive for shortness of breath. Negative for cough, hemoptysis and wheezing.    Cardiovascular: Positive for orthopnea and leg swelling. Negative for chest pain and palpitations.   Gastrointestinal: Negative for abdominal pain, blood in stool, constipation, diarrhea, nausea and vomiting.   Genitourinary: Negative for dysuria and hematuria.   Musculoskeletal: Negative for joint pain and myalgias.   Skin: Negative for rash.   Neurological: Negative for dizziness, loss of consciousness and weakness.       Past Medical History   has a past medical history of Asthma, Developmental delay, and Hypertension.    Surgical History  No prior surgical history    Family History  No prior family history of cardiac disease  Family history reviewed with patient. There is no family history that is pertinent to the chief complaint.     Social History   reports that he has never smoked. He has never used smokeless tobacco. He reports current alcohol use. He reports current drug use.    Allergies  Allergies   Allergen Reactions   • Penicillins Rash     Reaction: Rash per auntie        Medications  Prior to Admission Medications   Prescriptions Last Dose Informant Patient Reported? Taking?   Albuterol Sulfate 108 (90 Base) MCG/ACT AEROSOL POWDER, BREATH ACTIVATED 7/6/2021 at PM Patient Yes Yes   Sig: Inhale 2  Puffs every 6 hours as needed (SOB).   acetaminophen (TYLENOL) 500 MG Tab 7/5/2021 at PRN Patient Yes Yes   Sig: Take 500-1,000 mg by mouth every 6 hours as needed.   sulfamethoxazole-trimethoprim (BACTRIM DS) 800-160 MG tablet 7/5/2021 at UNK Patient Yes Yes   Sig: Take 1 tablet by mouth 2 times a day. For 10 days     Prescribed 06/26/2021      Facility-Administered Medications: None       Physical Exam  Temp:  [36.4 °C (97.6 °F)] 36.4 °C (97.6 °F)  Pulse:  [] 82  Resp:  [24-42] 30  BP: (143-182)/() 143/79  SpO2:  [90 %-100 %] 96 %    Physical Exam  Vitals and nursing note reviewed.   Constitutional:       General: He is not in acute distress.     Appearance: Normal appearance.   HENT:      Mouth/Throat:      Mouth: Mucous membranes are moist.   Eyes:      Extraocular Movements: Extraocular movements intact.   Cardiovascular:      Rate and Rhythm: Normal rate and regular rhythm.      Heart sounds: No murmur heard.   No gallop.    Pulmonary:      Effort: Pulmonary effort is normal.      Breath sounds: Rales (Diffuse) present. No wheezing or rhonchi.   Abdominal:      General: Abdomen is flat. Bowel sounds are normal. There is no distension.      Palpations: Abdomen is soft.      Tenderness: There is no abdominal tenderness.   Musculoskeletal:         General: No deformity. Normal range of motion.      Right lower leg: Edema present.      Left lower leg: Edema present.   Neurological:      General: No focal deficit present.      Mental Status: He is alert and oriented to person, place, and time.         Laboratory:  Recent Labs     07/07/21 2153   WBC 12.7*   RBC 4.20*   HEMOGLOBIN 12.3*   HEMATOCRIT 38.7*   MCV 92.1   MCH 29.3   MCHC 31.8*   RDW 44.5   PLATELETCT 276   MPV 10.5     Recent Labs     07/07/21 2153   SODIUM 140   POTASSIUM 4.7   CHLORIDE 112   CO2 17*   GLUCOSE 134*   BUN 35*   CREATININE 1.87*   CALCIUM 8.4*     Recent Labs     07/07/21 2153   GLUCOSE 134*         Recent Labs      07/07/21  2153   NTPROBNP 9418*         Recent Labs     07/07/21  2153   TROPONINT 38*       Imaging:  OUTSIDE IMAGES-DX CHEST   Final Result      CT-CTA CHEST PULMONARY ARTERY W/ RECONS   Final Result      1.  No evidence of pulmonary embolus.      2.  Bibasilar consolidation with trace effusions.            DX-CHEST-PORTABLE (1 VIEW)   Final Result      Enlarged cardiac silhouette with small bilateral pleural effusions and bibasilar consolidation      EC-ECHOCARDIOGRAM COMPLETE W/O CONT    (Results Pending)   US-RENAL    (Results Pending)       X-Ray:  I have personally reviewed the images and compared with prior images.    Assessment/Plan:  I anticipate this patient will require at least two midnights for appropriate medical management, necessitating inpatient admission.    * Acute respiratory failure (HCC)- (present on admission)  Assessment & Plan  Patient with worsening respiratory distress since Monday  Noted to have bilateral lower extremity edema  BNP elevated 9418 and troponin 38  Denies any prior history of cardiac disease in the past  Is reported to have uncontrolled blood pressure for possibly the past 2 years  - TTE  - Conservative IVF use  - Daily weights, I/O  - Fluid restrict 1200 CC  - Low Na diet  - Lasix 40mg IV x1  -We'll initiate metoprolol 12.5 mg twice daily  -Consider ACEi pending patient response to BB initiation and TTE for confirmation of heart failure  - O2 therapy as needed      Essential hypertension  Assessment & Plan  Reported to have history of hypertension per aunt at bedside  -We'll initiate metoprolol 12.5 mg twice daily for hypertension and for possible CHF  -IV antihypertensives as needed  -Adjust p.o. medications as necessary    Hyperglycemia- (present on admission)  Assessment & Plan  A1c  ISS    Leukocytosis- (present on admission)  Assessment & Plan  procal and UA    YODIT (acute kidney injury) (HCC)- (present on admission)  Assessment & Plan  Holding off on urine studies for  now due to lasix administration, order when not on lasix  Holding off on IVF for now due to respiratory status  Renal US    Elevated troponin- (present on admission)  Assessment & Plan  Tele monitor  Repeat trop    Elevated brain natriuretic peptide (BNP) level- (present on admission)  Assessment & Plan  Plan as above      VTE prophylaxis: heparin ppx

## 2021-07-08 NOTE — ASSESSMENT & PLAN NOTE
Holding off on urine studies for now due to lasix administration, order when not on lasix  Holding off on IVF for now due to respiratory status  Renal US  7/9/2021:  Nephrology consultation placed appreciate the recommendations.  Patient will have 60 mg Lasix challenge at present.  Continue to monitor kidney function.  Should be noted the patient did receive contrast several days ago for CTA for evaluation of pulmonary embolus likely this is also resulting in uptrending creatinine at present.  Continue to monitor potassium  7/10/2021:  Patient continues to have improvement in respiration kidney function improved with dose of Lasix on prior day.  And get a repeat dose today.  Evaluate tomorrow

## 2021-07-08 NOTE — ASSESSMENT & PLAN NOTE
Reported to have history of hypertension per aunt at bedside  -We'll initiate metoprolol 12.5 mg twice daily for hypertension and for possible CHF  -IV antihypertensives as needed  -Adjust p.o. medications as necessary

## 2021-07-08 NOTE — ED NOTES
Pt given breakfast tray and switched o2 from oxy mask 4 lpm. To o2 @ 4lpm/nc challenge @ this time o2 cannula to see if pt can tolerate and keep o2 sats WDL.

## 2021-07-08 NOTE — ED NOTES
Us to bs, awaiting bed placement. Called sw for help with hotel for family, pt has no other needs currently.

## 2021-07-09 ENCOUNTER — APPOINTMENT (OUTPATIENT)
Dept: CARDIOLOGY | Facility: MEDICAL CENTER | Age: 36
DRG: 917 | End: 2021-07-09
Attending: STUDENT IN AN ORGANIZED HEALTH CARE EDUCATION/TRAINING PROGRAM
Payer: COMMERCIAL

## 2021-07-09 PROBLEM — E87.5 HYPERKALEMIA: Status: ACTIVE | Noted: 2021-07-09

## 2021-07-09 LAB
ANION GAP SERPL CALC-SCNC: 10 MMOL/L (ref 7–16)
BASOPHILS # BLD AUTO: 0.1 % (ref 0–1.8)
BASOPHILS # BLD: 0.01 K/UL (ref 0–0.12)
BUN SERPL-MCNC: 50 MG/DL (ref 8–22)
CALCIUM SERPL-MCNC: 8.1 MG/DL (ref 8.5–10.5)
CHLORIDE SERPL-SCNC: 115 MMOL/L (ref 96–112)
CK SERPL-CCNC: 40 U/L (ref 0–154)
CO2 SERPL-SCNC: 16 MMOL/L (ref 20–33)
CREAT SERPL-MCNC: 2.01 MG/DL (ref 0.5–1.4)
EOSINOPHIL # BLD AUTO: 0.01 K/UL (ref 0–0.51)
EOSINOPHIL NFR BLD: 0.1 % (ref 0–6.9)
ERYTHROCYTE [DISTWIDTH] IN BLOOD BY AUTOMATED COUNT: 45 FL (ref 35.9–50)
GLUCOSE BLD-MCNC: 124 MG/DL (ref 65–99)
GLUCOSE BLD-MCNC: 124 MG/DL (ref 65–99)
GLUCOSE BLD-MCNC: 151 MG/DL (ref 65–99)
GLUCOSE BLD-MCNC: 160 MG/DL (ref 65–99)
GLUCOSE SERPL-MCNC: 134 MG/DL (ref 65–99)
HCT VFR BLD AUTO: 36.5 % (ref 42–52)
HGB BLD-MCNC: 11.5 G/DL (ref 14–18)
IMM GRANULOCYTES # BLD AUTO: 0.07 K/UL (ref 0–0.11)
IMM GRANULOCYTES NFR BLD AUTO: 0.6 % (ref 0–0.9)
LV EJECT FRACT  99904: 65
LV EJECT FRACT MOD 2C 99903: 56.53
LV EJECT FRACT MOD 4C 99902: 71.28
LV EJECT FRACT MOD BP 99901: 65.36
LYMPHOCYTES # BLD AUTO: 0.67 K/UL (ref 1–4.8)
LYMPHOCYTES NFR BLD: 5.6 % (ref 22–41)
MAGNESIUM SERPL-MCNC: 2.8 MG/DL (ref 1.5–2.5)
MCH RBC QN AUTO: 29.6 PG (ref 27–33)
MCHC RBC AUTO-ENTMCNC: 31.5 G/DL (ref 33.7–35.3)
MCV RBC AUTO: 94.1 FL (ref 81.4–97.8)
MONOCYTES # BLD AUTO: 0.54 K/UL (ref 0–0.85)
MONOCYTES NFR BLD AUTO: 4.5 % (ref 0–13.4)
NEUTROPHILS # BLD AUTO: 10.7 K/UL (ref 1.82–7.42)
NEUTROPHILS NFR BLD: 89.1 % (ref 44–72)
NRBC # BLD AUTO: 0 K/UL
NRBC BLD-RTO: 0 /100 WBC
PLATELET # BLD AUTO: 295 K/UL (ref 164–446)
PMV BLD AUTO: 10.6 FL (ref 9–12.9)
POTASSIUM SERPL-SCNC: 6.1 MMOL/L (ref 3.6–5.5)
RBC # BLD AUTO: 3.88 M/UL (ref 4.7–6.1)
SODIUM SERPL-SCNC: 141 MMOL/L (ref 135–145)
WBC # BLD AUTO: 12 K/UL (ref 4.8–10.8)

## 2021-07-09 PROCEDURE — 700111 HCHG RX REV CODE 636 W/ 250 OVERRIDE (IP): Performed by: INTERNAL MEDICINE

## 2021-07-09 PROCEDURE — 94669 MECHANICAL CHEST WALL OSCILL: CPT

## 2021-07-09 PROCEDURE — 700111 HCHG RX REV CODE 636 W/ 250 OVERRIDE (IP): Performed by: STUDENT IN AN ORGANIZED HEALTH CARE EDUCATION/TRAINING PROGRAM

## 2021-07-09 PROCEDURE — 36415 COLL VENOUS BLD VENIPUNCTURE: CPT

## 2021-07-09 PROCEDURE — 82550 ASSAY OF CK (CPK): CPT

## 2021-07-09 PROCEDURE — 94640 AIRWAY INHALATION TREATMENT: CPT

## 2021-07-09 PROCEDURE — 93306 TTE W/DOPPLER COMPLETE: CPT | Mod: 26 | Performed by: INTERNAL MEDICINE

## 2021-07-09 PROCEDURE — 99253 IP/OBS CNSLTJ NEW/EST LOW 45: CPT | Performed by: INTERNAL MEDICINE

## 2021-07-09 PROCEDURE — 82962 GLUCOSE BLOOD TEST: CPT

## 2021-07-09 PROCEDURE — 83735 ASSAY OF MAGNESIUM: CPT

## 2021-07-09 PROCEDURE — 770020 HCHG ROOM/CARE - TELE (206)

## 2021-07-09 PROCEDURE — 700102 HCHG RX REV CODE 250 W/ 637 OVERRIDE(OP): Performed by: STUDENT IN AN ORGANIZED HEALTH CARE EDUCATION/TRAINING PROGRAM

## 2021-07-09 PROCEDURE — 99255 IP/OBS CONSLTJ NEW/EST HI 80: CPT | Performed by: INTERNAL MEDICINE

## 2021-07-09 PROCEDURE — 700101 HCHG RX REV CODE 250: Performed by: STUDENT IN AN ORGANIZED HEALTH CARE EDUCATION/TRAINING PROGRAM

## 2021-07-09 PROCEDURE — 93306 TTE W/DOPPLER COMPLETE: CPT

## 2021-07-09 PROCEDURE — A9270 NON-COVERED ITEM OR SERVICE: HCPCS | Performed by: STUDENT IN AN ORGANIZED HEALTH CARE EDUCATION/TRAINING PROGRAM

## 2021-07-09 PROCEDURE — 85025 COMPLETE CBC W/AUTO DIFF WBC: CPT

## 2021-07-09 PROCEDURE — 700101 HCHG RX REV CODE 250: Performed by: EMERGENCY MEDICINE

## 2021-07-09 PROCEDURE — 80048 BASIC METABOLIC PNL TOTAL CA: CPT

## 2021-07-09 PROCEDURE — 99233 SBSQ HOSP IP/OBS HIGH 50: CPT | Performed by: STUDENT IN AN ORGANIZED HEALTH CARE EDUCATION/TRAINING PROGRAM

## 2021-07-09 PROCEDURE — 99223 1ST HOSP IP/OBS HIGH 75: CPT | Mod: GC | Performed by: INTERNAL MEDICINE

## 2021-07-09 RX ORDER — DOXYCYCLINE 100 MG/1
100 TABLET ORAL EVERY 12 HOURS
Status: DISCONTINUED | OUTPATIENT
Start: 2021-07-09 | End: 2021-07-09

## 2021-07-09 RX ORDER — DOXYCYCLINE 100 MG/1
100 TABLET ORAL EVERY 12 HOURS
Status: COMPLETED | OUTPATIENT
Start: 2021-07-09 | End: 2021-07-10

## 2021-07-09 RX ORDER — FUROSEMIDE 10 MG/ML
60 INJECTION INTRAMUSCULAR; INTRAVENOUS ONCE
Status: COMPLETED | OUTPATIENT
Start: 2021-07-09 | End: 2021-07-09

## 2021-07-09 RX ORDER — CARVEDILOL 12.5 MG/1
12.5 TABLET ORAL 2 TIMES DAILY WITH MEALS
Status: DISCONTINUED | OUTPATIENT
Start: 2021-07-09 | End: 2021-07-10

## 2021-07-09 RX ORDER — DIPHENHYDRAMINE HYDROCHLORIDE 50 MG/ML
25 INJECTION INTRAMUSCULAR; INTRAVENOUS EVERY 6 HOURS PRN
Status: DISCONTINUED | OUTPATIENT
Start: 2021-07-09 | End: 2021-07-11 | Stop reason: HOSPADM

## 2021-07-09 RX ADMIN — INSULIN LISPRO 1 UNITS: 100 INJECTION, SOLUTION INTRAVENOUS; SUBCUTANEOUS at 20:14

## 2021-07-09 RX ADMIN — METHYLPREDNISOLONE SODIUM SUCCINATE 62.5 MG: 125 INJECTION, POWDER, FOR SOLUTION INTRAMUSCULAR; INTRAVENOUS at 11:29

## 2021-07-09 RX ADMIN — CARVEDILOL 12.5 MG: 12.5 TABLET, FILM COATED ORAL at 17:05

## 2021-07-09 RX ADMIN — HEPARIN SODIUM 5000 UNITS: 5000 INJECTION, SOLUTION INTRAVENOUS; SUBCUTANEOUS at 17:05

## 2021-07-09 RX ADMIN — DIPHENHYDRAMINE HYDROCHLORIDE: 50 INJECTION INTRAMUSCULAR; INTRAVENOUS at 18:29

## 2021-07-09 RX ADMIN — METHYLPREDNISOLONE SODIUM SUCCINATE 62.5 MG: 125 INJECTION, POWDER, FOR SOLUTION INTRAMUSCULAR; INTRAVENOUS at 02:00

## 2021-07-09 RX ADMIN — FUROSEMIDE 60 MG: 10 INJECTION, SOLUTION INTRAMUSCULAR; INTRAVENOUS at 14:41

## 2021-07-09 RX ADMIN — ENALAPRILAT 1.25 MG: 1.25 INJECTION INTRAVENOUS at 00:56

## 2021-07-09 RX ADMIN — HEPARIN SODIUM 5000 UNITS: 5000 INJECTION, SOLUTION INTRAVENOUS; SUBCUTANEOUS at 02:00

## 2021-07-09 RX ADMIN — HEPARIN SODIUM 5000 UNITS: 5000 INJECTION, SOLUTION INTRAVENOUS; SUBCUTANEOUS at 11:30

## 2021-07-09 RX ADMIN — METHYLPREDNISOLONE SODIUM SUCCINATE 62.5 MG: 125 INJECTION, POWDER, FOR SOLUTION INTRAMUSCULAR; INTRAVENOUS at 17:05

## 2021-07-09 RX ADMIN — CEFTRIAXONE SODIUM 2 G: 10 INJECTION, POWDER, FOR SOLUTION INTRAVENOUS at 15:05

## 2021-07-09 RX ADMIN — LEVALBUTEROL HYDROCHLORIDE 0.63 MG: 0.63 SOLUTION RESPIRATORY (INHALATION) at 15:02

## 2021-07-09 RX ADMIN — IPRATROPIUM BROMIDE AND ALBUTEROL SULFATE 3 ML: .5; 2.5 SOLUTION RESPIRATORY (INHALATION) at 07:21

## 2021-07-09 RX ADMIN — FUROSEMIDE 40 MG: 10 INJECTION, SOLUTION INTRAVENOUS at 05:11

## 2021-07-09 RX ADMIN — DOXYCYCLINE 100 MG: 100 TABLET, FILM COATED ORAL at 17:05

## 2021-07-09 RX ADMIN — METOPROLOL TARTRATE 12.5 MG: 25 TABLET, FILM COATED ORAL at 05:11

## 2021-07-09 RX ADMIN — LEVALBUTEROL HYDROCHLORIDE 0.63 MG: 0.63 SOLUTION RESPIRATORY (INHALATION) at 19:27

## 2021-07-09 ASSESSMENT — ENCOUNTER SYMPTOMS
NAUSEA: 0
VOMITING: 0
SORE THROAT: 0
PALPITATIONS: 0
WEAKNESS: 0
COUGH: 0
LOSS OF CONSCIOUSNESS: 0
DIZZINESS: 0
MYALGIAS: 0
WEIGHT LOSS: 0
WHEEZING: 0
EYE PAIN: 0
ABDOMINAL PAIN: 0
CONSTIPATION: 0
BLOOD IN STOOL: 0
DIARRHEA: 0
HEMOPTYSIS: 0
ORTHOPNEA: 1
CHILLS: 0
FEVER: 0
BLURRED VISION: 0
SHORTNESS OF BREATH: 1

## 2021-07-09 ASSESSMENT — COGNITIVE AND FUNCTIONAL STATUS - GENERAL
SUGGESTED CMS G CODE MODIFIER MOBILITY: CL
WALKING IN HOSPITAL ROOM: A LOT
PERSONAL GROOMING: A LITTLE
DAILY ACTIVITIY SCORE: 15
TURNING FROM BACK TO SIDE WHILE IN FLAT BAD: A LITTLE
DRESSING REGULAR LOWER BODY CLOTHING: A LOT
MOVING FROM LYING ON BACK TO SITTING ON SIDE OF FLAT BED: A LOT
TOILETING: A LOT
STANDING UP FROM CHAIR USING ARMS: A LOT
HELP NEEDED FOR BATHING: A LOT
DRESSING REGULAR UPPER BODY CLOTHING: A LOT
CLIMB 3 TO 5 STEPS WITH RAILING: A LOT
MOBILITY SCORE: 13
MOVING TO AND FROM BED TO CHAIR: A LOT
SUGGESTED CMS G CODE MODIFIER DAILY ACTIVITY: CK

## 2021-07-09 ASSESSMENT — PAIN DESCRIPTION - PAIN TYPE: TYPE: ACUTE PAIN

## 2021-07-09 NOTE — CONSULTS
Cardiology Initial Consultation    Date of Service  7/9/2021    Referring Physician  Wander Low M.D.    Reason for Consultation  Acute hypoxic respiratory failure    History of Presenting Illness  Nik Wasserman is a 35 y.o. male with a past medical history of obesity, developmental delay and asthma was transferred from [facility] due to worsening hypoxic respiratory failure requiring BiPAP.  He was later transitioned to 10 L oxygen mask.  On admission, CTA negative for PE, but did show consolidation. CXR significant for cardiomegaly with bilateral pulmonary effusions.  Renal ultrasound essentially normal.  EKG negative for ST elevations.  Troponins down trended.  BNP 9418.  CBC significant for leukocytosis likely related to steroid use.  BMP significant for elevated Cr.     This morning patient reports feeling shortness of breath, essentially unchanged since admission.  Denies chest pain, palpitations, dizziness or lightheadedness. Denies history of CVA, MI, or congenital cardiac defects. Aunt at bedside, reports he had lab work done ~2 years ago, and was not told he had any abnormalities. Prior to admission patient reports feeling WOODY with ADLs. Denies heavy ETOH or tobacco use. He does report using methamphetamine, and last use was a few weeks ago.     Review of Systems  Review of Systems    Past Medical History   has a past medical history of Asthma, Developmental delay, and Hypertension.    Surgical History   has no past surgical history on file.    Family History  family history is not on file.    Social History   reports that he has never smoked. He has never used smokeless tobacco. He reports current alcohol use. He reports current drug use.    Medications  Prior to Admission Medications   Prescriptions Last Dose Informant Patient Reported? Taking?   Albuterol Sulfate 108 (90 Base) MCG/ACT AEROSOL POWDER, BREATH ACTIVATED 7/6/2021 at PM Patient Yes Yes   Sig: Inhale 2 Puffs every 6 hours as  needed (SOB).   acetaminophen (TYLENOL) 500 MG Tab 7/5/2021 at PRN Patient Yes Yes   Sig: Take 500-1,000 mg by mouth every 6 hours as needed.   sulfamethoxazole-trimethoprim (BACTRIM DS) 800-160 MG tablet 7/5/2021 at UNK Patient Yes Yes   Sig: Take 1 tablet by mouth 2 times a day. For 10 days     Prescribed 06/26/2021      Facility-Administered Medications: None       Allergies  Allergies   Allergen Reactions   • Penicillins Rash     Reaction: Rash per auntie        Vital signs in last 24 hours  Temp:  [35.8 °C (96.5 °F)-36.7 °C (98 °F)] 35.8 °C (96.5 °F)  Pulse:  [59-84] 65  Resp:  [16-30] 16  BP: (132-178)/() 152/85  SpO2:  [93 %-99 %] 95 %    Physical Exam  General: Alert, NAD  HEENT: EOMI, no conjunctival injection or sclera icterus  CV: RRR. S1S2.   Lungs: Diffuse course crackles, poor air movement  Extremities: No LE edema   Skin: No cyanosis or jaundice       Lab Review  Lab Results   Component Value Date/Time    WBC 12.0 (H) 07/09/2021 02:05 AM    RBC 3.88 (L) 07/09/2021 02:05 AM    HEMOGLOBIN 11.5 (L) 07/09/2021 02:05 AM    HEMATOCRIT 36.5 (L) 07/09/2021 02:05 AM    MCV 94.1 07/09/2021 02:05 AM    MCH 29.6 07/09/2021 02:05 AM    MCHC 31.5 (L) 07/09/2021 02:05 AM    MPV 10.6 07/09/2021 02:05 AM      Lab Results   Component Value Date/Time    SODIUM 141 07/09/2021 02:05 AM    POTASSIUM 6.1 (H) 07/09/2021 02:05 AM    CHLORIDE 115 (H) 07/09/2021 02:05 AM    CO2 16 (L) 07/09/2021 02:05 AM    GLUCOSE 134 (H) 07/09/2021 02:05 AM    BUN 50 (H) 07/09/2021 02:05 AM    CREATININE 2.01 (H) 07/09/2021 02:05 AM      Lab Results   Component Value Date/Time    ASTSGOT 17 12/05/2018 11:23 PM    ALTSGPT 26 12/05/2018 11:23 PM     Lab Results   Component Value Date/Time    TROPONINT 29 (H) 07/08/2021 02:02 AM       Recent Labs     07/07/21  7863   NTPROBNP 9418*         Assessment/Plan  Nik Wasserman is a 35 y.o. male with a past medical history of obesity, developmental delay and asthma was transferred from [facility] due to worsening hypoxic respiratory failure requiring BiPAP.  He was later transitioned to 10 L oxygen mask.  On admission, CTA negative for PE. CXR significant for cardiomegaly with bilateral pulmonary effusions.  Renal ultrasound essentially normal.  EKG negative for ST elevations.  Troponins down trended.  BNP 9418.  CBC significant for leukocytosis likely related to steroid use.  BMP significant for elevated Cr.     #Acute hypoxic respiratory failure  #HTN  -ECHO showed preserved EF  -Consider starting ACE/ARB for HTN   -Consider starting ABx for possible PNA. CTA chest showed consolidation, and hypoxia not improved with lasix.  -Hypoxia less likely cardiac etiology     #Hypermagnesia  #Hyperkalemia  -Correct electrolytes  -CTM electrolytes    #Acute kidney injury   -Cr 2.0, baseline less then 1  -Caution with lasix

## 2021-07-09 NOTE — PROGRESS NOTES
Bedside report received from SILVIO Haley. Call light and belongings within reach. Bed locked and in lowest position. Alarm and fall precautions in place.

## 2021-07-09 NOTE — HOSPITAL COURSE
Nik Wasserman is a 35 y.o. male with developmental delay and asthma. Who presented 7/7/2021 as transfer from [facility] where he was seen for shortness of breath since Monday. Patient reports that he was watching fireworks on Monday, and noticed he was having some difficulty breathing. Patient has history of asthma and he thought that it may be due to smoke irritating his lungs. Patient reports that they ignored it and they're hoping that his breathing is improved, but did not. There were no associated fevers, chills, coughing. He did however develop chest pain that started the night prior to his arrival. Additionally, on questioning he reports that he does have shortness of breath when lying flat which has been worsening for the past few days. He currently denies any prior history of cardiac disease in the past. However, per his aunt at bedside, patient was diagnosed with high blood pressure which he believes may have been as long as 2 years ago which he has not been taking medications for. Patient denies further complaints. While at [facility], patient was noted to have persistent hypoxia and required BiPAP therapy. He was additionally noted to have elevated BNP for which she was given Lasix 40 mg IV and was started on nitro drip. Patient symptoms did improve, however, he is developed headaches from the nitro drip at which time it was stopped. Patient was transferred to [facility] where he remained on BiPAP in route. On arrival to [facility] ED, patient was successfully able to be transitioned to 10 L oxygen mask.     [facility] ED: HR 80-1 01, RR 24-42, //102, O2 saturations 90% on 10 L oxygen mask. WBC 12.7, Hb 12.3, CO2 17, BUN 35, CR 1.87, glucose under 34, troponin 38, BNP 9418. CXR performed with bilateral pleural effusions and consolidations bilateral bases. CTA of the pulmonary arteries performed which is unremarkable PE, but again noted bibasilar consolidation with trace effusions. Patient was  given DuoNeb's.

## 2021-07-09 NOTE — CARE PLAN
Problem: Knowledge Deficit - Standard  Goal: Patient and family/care givers will demonstrate understanding of plan of care, disease process/condition, diagnostic tests and medications  Outcome: Progressing  Note: Plan of care discussed-pt verbalizes understanding           Patient is not progressing towards the following goals:

## 2021-07-09 NOTE — PROGRESS NOTES
[facility] Medicine Daily Progress Note    Date of Service  7/8/2021    Chief Complaint  Nik Wasserman is a 35 y.o. male admitted 7/7/2021 with shortness of breath    Hospital Course  Nik Wasserman is a 35 y.o. male with developmental delay and asthma. Who presented 7/7/2021 as transfer from [facility] where he was seen for shortness of breath since Monday. Patient reports that he was watching fireworks on Monday, and noticed he was having some difficulty breathing. Patient has history of asthma and he thought that it may be due to smoke irritating his lungs. Patient reports that they ignored it and they're hoping that his breathing is improved, but did not. There were no associated fevers, chills, coughing. He did however develop chest pain that started the night prior to his arrival. Additionally, on questioning he reports that he does have shortness of breath when lying flat which has been worsening for the past few days. He currently denies any prior history of cardiac disease in the past. However, per his aunt at bedside, patient was diagnosed with high blood pressure which he believes may have been as long as 2 years ago which he has not been taking medications for. Patient denies further complaints. While at [facility], patient was noted to have persistent hypoxia and required BiPAP therapy. He was additionally noted to have elevated BNP for which she was given Lasix 40 mg IV and was started on nitro drip. Patient symptoms did improve, however, he is developed headaches from the nitro drip at which time it was stopped. Patient was transferred to [facility] where he remained on BiPAP in route. On arrival to [facility] ED, patient was successfully able to be transitioned to 10 L oxygen mask.     [facility] ED: HR 80-1 01, RR 24-42, //102, O2 saturations 90% on 10 L oxygen mask. WBC 12.7, Hb 12.3, CO2 17, BUN 35, CR 1.87, glucose under 34, troponin 38, BNP 9418. CXR performed with bilateral pleural  effusions and consolidations bilateral bases. CTA of the pulmonary arteries performed which is unremarkable PE, but again noted bibasilar consolidation with trace effusions. Patient was given DuoNeb's.      Interval Problem Update  7/8/2021 interval update  Patient endorses improvement in breathing mild evidence of wheezing patient got one-time dose Solu-Medrol 125 at [facility] likely contributing to leukocytosis.  Presently adjusted breathing treatments discontinued albuterol initiated Xopenex every 6 hours scheduled.  Scheduled Solu-Medrol 62.5 every 8 hours.  Lasix 40 mg IV daily.  One-time dose now.      I have personally seen and examined the patient at bedside. I discussed the plan of care with patient and bedside RN.    Consultants/Specialty  None    Code Status  Full Code    Disposition  Patient is not medically cleared.   Anticipate discharge to to home with close outpatient follow-up.  I have placed the appropriate orders for post-discharge needs.    Review of Systems  Review of Systems   Constitutional: Negative for chills, fever and weight loss.   HENT: Negative for hearing loss and sore throat.    Eyes: Negative for blurred vision and pain.   Respiratory: Positive for shortness of breath. Negative for cough, hemoptysis and wheezing.    Cardiovascular: Positive for orthopnea and leg swelling. Negative for chest pain and palpitations.   Gastrointestinal: Negative for abdominal pain, blood in stool, constipation, diarrhea, nausea and vomiting.   Genitourinary: Negative for dysuria and hematuria.   Musculoskeletal: Negative for joint pain and myalgias.   Skin: Negative for rash.   Neurological: Negative for dizziness, loss of consciousness and weakness.        Physical Exam  Temp:  [36.1 °C (97 °F)-36.4 °C (97.6 °F)] 36.1 °C (97 °F)  Pulse:  [] 75  Resp:  [16-42] 24  BP: (132-182)/() 142/83  SpO2:  [90 %-100 %] 98 %    Physical Exam  Vitals and nursing note reviewed.   Constitutional:        General: He is not in acute distress.     Appearance: Normal appearance. He is not ill-appearing.   HENT:      Right Ear: External ear normal.      Left Ear: External ear normal.      Nose: No congestion or rhinorrhea.      Mouth/Throat:      Mouth: Mucous membranes are moist.   Eyes:      General:         Right eye: No discharge.         Left eye: No discharge.      Extraocular Movements: Extraocular movements intact.   Cardiovascular:      Rate and Rhythm: Normal rate and regular rhythm.      Heart sounds: No murmur heard.   No gallop.    Pulmonary:      Effort: Pulmonary effort is normal.      Breath sounds: Rales (Diffuse) present. No wheezing or rhonchi.   Abdominal:      General: Abdomen is flat. Bowel sounds are normal. There is no distension.      Palpations: Abdomen is soft.      Tenderness: There is no abdominal tenderness.   Musculoskeletal:         General: No deformity. Normal range of motion.      Cervical back: No rigidity.      Right lower leg: Edema present.      Left lower leg: Edema present.   Skin:     General: Skin is warm and dry.      Coloration: Skin is not jaundiced or pale.      Findings: No bruising.   Neurological:      General: No focal deficit present.      Mental Status: He is alert and oriented to person, place, and time.      Cranial Nerves: No cranial nerve deficit.      Motor: No weakness.   Psychiatric:         Behavior: Behavior normal.         Fluids  No intake or output data in the 24 hours ending 07/08/21 1707    Laboratory  Recent Labs     07/07/21  2153   WBC 12.7*   RBC 4.20*   HEMOGLOBIN 12.3*   HEMATOCRIT 38.7*   MCV 92.1   MCH 29.3   MCHC 31.8*   RDW 44.5   PLATELETCT 276   MPV 10.5     Recent Labs     07/07/21  2153   SODIUM 140   POTASSIUM 4.7   CHLORIDE 112   CO2 17*   GLUCOSE 134*   BUN 35*   CREATININE 1.87*   CALCIUM 8.4*                   Imaging  US-RENAL   Final Result      Normal renal ultrasound.      OUTSIDE IMAGES-DX CHEST   Final Result      CT-CTA CHEST  PULMONARY ARTERY W/ RECONS   Final Result      1.  No evidence of pulmonary embolus.      2.  Bibasilar consolidation with trace effusions.            DX-CHEST-PORTABLE (1 VIEW)   Final Result      Enlarged cardiac silhouette with small bilateral pleural effusions and bibasilar consolidation      EC-ECHOCARDIOGRAM COMPLETE W/O CONT    (Results Pending)        Assessment/Plan  * Acute respiratory failure (HCC)- (present on admission)  Assessment & Plan  Patient with worsening respiratory distress since Monday  Noted to have bilateral lower extremity edema  BNP elevated 9418 and troponin 38  Denies any prior history of cardiac disease in the past  Is reported to have uncontrolled blood pressure for possibly the past 2 years  - TTE  - Conservative IVF use  - Daily weights, I/O  - Fluid restrict 1200 CC  - Low Na diet  - Lasix 40mg IV x1  -We'll initiate metoprolol 12.5 mg twice daily  -Consider ACEi pending patient response to BB initiation and TTE for confirmation of heart failure  - O2 therapy as needed      Essential hypertension  Assessment & Plan  Reported to have history of hypertension per aunt at bedside  -We'll initiate metoprolol 12.5 mg twice daily for hypertension and for possible CHF  -IV antihypertensives as needed  -Adjust p.o. medications as necessary    Hyperglycemia- (present on admission)  Assessment & Plan  A1c  ISS    Leukocytosis- (present on admission)  Assessment & Plan  procal and UA    YODIT (acute kidney injury) (HCC)- (present on admission)  Assessment & Plan  Holding off on urine studies for now due to lasix administration, order when not on lasix  Holding off on IVF for now due to respiratory status  Renal US    Elevated troponin- (present on admission)  Assessment & Plan  Tele monitor  Repeat trop    Elevated brain natriuretic peptide (BNP) level- (present on admission)  Assessment & Plan  Plan as above       VTE prophylaxis: SCDs/TEDs, enoxaparin ppx and heparin ppx    I have performed a  physical exam and reviewed and updated ROS and Plan today (7/8/2021). In review of yesterday's note (7/7/2021), there are no changes except as documented above.

## 2021-07-09 NOTE — HEART FAILURE PROGRAM
Echocardiogram is not supportive of a heart failure diagnosis. Cardiology have noted this. I've reached out to attending hospitalist.    Thank you, Sudha, Cardio RN Navigator [phone]

## 2021-07-09 NOTE — DIETARY
NUTRITION SERVICES: BMI - Pt with BMI >40 (=Body mass index is 49.09 kg/m².), Class III obesity. Weight loss counseling not appropriate in acute care setting. RECOMMEND - If appropriate at DC please refer to outpatient nutrition services for weight management.

## 2021-07-10 LAB
ANION GAP SERPL CALC-SCNC: 9 MMOL/L (ref 7–16)
BASOPHILS # BLD AUTO: 0.1 % (ref 0–1.8)
BASOPHILS # BLD: 0.01 K/UL (ref 0–0.12)
BUN SERPL-MCNC: 61 MG/DL (ref 8–22)
CALCIUM SERPL-MCNC: 8.3 MG/DL (ref 8.5–10.5)
CHLORIDE SERPL-SCNC: 111 MMOL/L (ref 96–112)
CO2 SERPL-SCNC: 18 MMOL/L (ref 20–33)
CREAT SERPL-MCNC: 1.86 MG/DL (ref 0.5–1.4)
EOSINOPHIL # BLD AUTO: 0 K/UL (ref 0–0.51)
EOSINOPHIL NFR BLD: 0 % (ref 0–6.9)
ERYTHROCYTE [DISTWIDTH] IN BLOOD BY AUTOMATED COUNT: 44.4 FL (ref 35.9–50)
GLUCOSE BLD-MCNC: 111 MG/DL (ref 65–99)
GLUCOSE BLD-MCNC: 122 MG/DL (ref 65–99)
GLUCOSE BLD-MCNC: 125 MG/DL (ref 65–99)
GLUCOSE BLD-MCNC: 129 MG/DL (ref 65–99)
GLUCOSE SERPL-MCNC: 131 MG/DL (ref 65–99)
HCT VFR BLD AUTO: 37.2 % (ref 42–52)
HGB BLD-MCNC: 11.7 G/DL (ref 14–18)
IMM GRANULOCYTES # BLD AUTO: 0.04 K/UL (ref 0–0.11)
IMM GRANULOCYTES NFR BLD AUTO: 0.4 % (ref 0–0.9)
LYMPHOCYTES # BLD AUTO: 0.65 K/UL (ref 1–4.8)
LYMPHOCYTES NFR BLD: 6.5 % (ref 22–41)
MCH RBC QN AUTO: 29.5 PG (ref 27–33)
MCHC RBC AUTO-ENTMCNC: 31.5 G/DL (ref 33.7–35.3)
MCV RBC AUTO: 93.9 FL (ref 81.4–97.8)
MONOCYTES # BLD AUTO: 0.77 K/UL (ref 0–0.85)
MONOCYTES NFR BLD AUTO: 7.6 % (ref 0–13.4)
NEUTROPHILS # BLD AUTO: 8.6 K/UL (ref 1.82–7.42)
NEUTROPHILS NFR BLD: 85.4 % (ref 44–72)
NRBC # BLD AUTO: 0 K/UL
NRBC BLD-RTO: 0 /100 WBC
PLATELET # BLD AUTO: 299 K/UL (ref 164–446)
PMV BLD AUTO: 11.1 FL (ref 9–12.9)
POTASSIUM SERPL-SCNC: 5.7 MMOL/L (ref 3.6–5.5)
RBC # BLD AUTO: 3.96 M/UL (ref 4.7–6.1)
SODIUM SERPL-SCNC: 138 MMOL/L (ref 135–145)
WBC # BLD AUTO: 10.1 K/UL (ref 4.8–10.8)

## 2021-07-10 PROCEDURE — 94669 MECHANICAL CHEST WALL OSCILL: CPT

## 2021-07-10 PROCEDURE — 700101 HCHG RX REV CODE 250: Performed by: STUDENT IN AN ORGANIZED HEALTH CARE EDUCATION/TRAINING PROGRAM

## 2021-07-10 PROCEDURE — 99232 SBSQ HOSP IP/OBS MODERATE 35: CPT | Mod: GC | Performed by: INTERNAL MEDICINE

## 2021-07-10 PROCEDURE — 82962 GLUCOSE BLOOD TEST: CPT | Mod: 91

## 2021-07-10 PROCEDURE — A9270 NON-COVERED ITEM OR SERVICE: HCPCS | Performed by: STUDENT IN AN ORGANIZED HEALTH CARE EDUCATION/TRAINING PROGRAM

## 2021-07-10 PROCEDURE — 700111 HCHG RX REV CODE 636 W/ 250 OVERRIDE (IP): Performed by: STUDENT IN AN ORGANIZED HEALTH CARE EDUCATION/TRAINING PROGRAM

## 2021-07-10 PROCEDURE — A9270 NON-COVERED ITEM OR SERVICE: HCPCS | Performed by: INTERNAL MEDICINE

## 2021-07-10 PROCEDURE — 85025 COMPLETE CBC W/AUTO DIFF WBC: CPT

## 2021-07-10 PROCEDURE — 770020 HCHG ROOM/CARE - TELE (206)

## 2021-07-10 PROCEDURE — 700102 HCHG RX REV CODE 250 W/ 637 OVERRIDE(OP): Performed by: INTERNAL MEDICINE

## 2021-07-10 PROCEDURE — 700102 HCHG RX REV CODE 250 W/ 637 OVERRIDE(OP): Performed by: STUDENT IN AN ORGANIZED HEALTH CARE EDUCATION/TRAINING PROGRAM

## 2021-07-10 PROCEDURE — 99233 SBSQ HOSP IP/OBS HIGH 50: CPT | Performed by: STUDENT IN AN ORGANIZED HEALTH CARE EDUCATION/TRAINING PROGRAM

## 2021-07-10 PROCEDURE — 36415 COLL VENOUS BLD VENIPUNCTURE: CPT

## 2021-07-10 PROCEDURE — 99233 SBSQ HOSP IP/OBS HIGH 50: CPT | Performed by: INTERNAL MEDICINE

## 2021-07-10 PROCEDURE — 80048 BASIC METABOLIC PNL TOTAL CA: CPT

## 2021-07-10 PROCEDURE — 700101 HCHG RX REV CODE 250: Performed by: EMERGENCY MEDICINE

## 2021-07-10 PROCEDURE — 94640 AIRWAY INHALATION TREATMENT: CPT

## 2021-07-10 RX ORDER — CARVEDILOL 25 MG/1
25 TABLET ORAL 2 TIMES DAILY WITH MEALS
Status: DISCONTINUED | OUTPATIENT
Start: 2021-07-11 | End: 2021-07-11 | Stop reason: HOSPADM

## 2021-07-10 RX ORDER — FUROSEMIDE 40 MG/1
40 TABLET ORAL ONCE
Status: COMPLETED | OUTPATIENT
Start: 2021-07-10 | End: 2021-07-10

## 2021-07-10 RX ADMIN — HEPARIN SODIUM 5000 UNITS: 5000 INJECTION, SOLUTION INTRAVENOUS; SUBCUTANEOUS at 17:24

## 2021-07-10 RX ADMIN — CARVEDILOL 12.5 MG: 12.5 TABLET, FILM COATED ORAL at 17:24

## 2021-07-10 RX ADMIN — LEVALBUTEROL HYDROCHLORIDE 0.63 MG: 0.63 SOLUTION RESPIRATORY (INHALATION) at 05:04

## 2021-07-10 RX ADMIN — LEVALBUTEROL HYDROCHLORIDE 0.63 MG: 0.63 SOLUTION RESPIRATORY (INHALATION) at 18:06

## 2021-07-10 RX ADMIN — CLONIDINE HYDROCHLORIDE 0.1 MG: 0.1 TABLET ORAL at 11:40

## 2021-07-10 RX ADMIN — DOXYCYCLINE 100 MG: 100 TABLET, FILM COATED ORAL at 05:40

## 2021-07-10 RX ADMIN — LEVALBUTEROL HYDROCHLORIDE 0.63 MG: 0.63 SOLUTION RESPIRATORY (INHALATION) at 15:13

## 2021-07-10 RX ADMIN — HEPARIN SODIUM 5000 UNITS: 5000 INJECTION, SOLUTION INTRAVENOUS; SUBCUTANEOUS at 11:35

## 2021-07-10 RX ADMIN — DOCUSATE SODIUM 50 MG AND SENNOSIDES 8.6 MG 2 TABLET: 8.6; 5 TABLET, FILM COATED ORAL at 17:23

## 2021-07-10 RX ADMIN — CARVEDILOL 12.5 MG: 12.5 TABLET, FILM COATED ORAL at 07:33

## 2021-07-10 RX ADMIN — LEVALBUTEROL HYDROCHLORIDE 0.63 MG: 0.63 SOLUTION RESPIRATORY (INHALATION) at 07:15

## 2021-07-10 RX ADMIN — FUROSEMIDE 40 MG: 40 TABLET ORAL at 14:20

## 2021-07-10 RX ADMIN — HEPARIN SODIUM 5000 UNITS: 5000 INJECTION, SOLUTION INTRAVENOUS; SUBCUTANEOUS at 01:44

## 2021-07-10 RX ADMIN — METHYLPREDNISOLONE SODIUM SUCCINATE 62.5 MG: 125 INJECTION, POWDER, FOR SOLUTION INTRAMUSCULAR; INTRAVENOUS at 01:44

## 2021-07-10 RX ADMIN — ENALAPRILAT 1.25 MG: 1.25 INJECTION INTRAVENOUS at 12:36

## 2021-07-10 RX ADMIN — CEFTRIAXONE SODIUM 2 G: 10 INJECTION, POWDER, FOR SOLUTION INTRAVENOUS at 05:40

## 2021-07-10 RX ADMIN — DOCUSATE SODIUM 50 MG AND SENNOSIDES 8.6 MG 2 TABLET: 8.6; 5 TABLET, FILM COATED ORAL at 05:40

## 2021-07-10 RX ADMIN — METHYLPREDNISOLONE SODIUM SUCCINATE 62.5 MG: 125 INJECTION, POWDER, FOR SOLUTION INTRAMUSCULAR; INTRAVENOUS at 11:35

## 2021-07-10 ASSESSMENT — ENCOUNTER SYMPTOMS
INSOMNIA: 0
WEAKNESS: 0
CONSTIPATION: 0
SORE THROAT: 0
BLURRED VISION: 0
DIARRHEA: 0
DIZZINESS: 0
SHORTNESS OF BREATH: 1
WEIGHT LOSS: 0
COUGH: 0
HALLUCINATIONS: 0
BLOOD IN STOOL: 0
ABDOMINAL PAIN: 0
HEMOPTYSIS: 0
NAUSEA: 0
PALPITATIONS: 0
LOSS OF CONSCIOUSNESS: 0
MYALGIAS: 0
WHEEZING: 0
EYE PAIN: 0
VOMITING: 0
FEVER: 0
ORTHOPNEA: 1
NERVOUS/ANXIOUS: 0
CHILLS: 0

## 2021-07-10 ASSESSMENT — PAIN DESCRIPTION - PAIN TYPE: TYPE: ACUTE PAIN

## 2021-07-10 NOTE — CONSULTS
DATE OF SERVICE:  07/09/2021     REQUESTING PHYSICIAN:  Wander Low MD     REASON FOR CONSULTATION:  Acute kidney injury and hyperkalemia.     The patient seen and examined, medical records were reviewed.     HISTORY OF PRESENT ILLNESS:  The patient is a pleasant 35-year-old gentleman   with a past medical history significant for asthma, longstanding hypertension,   developmental delay, presented to an outside hospital with shortness of   breath.  The patient was thought to have a COPD exacerbation.  He was started   on steroids, and was transferred to [facility].  The patient was   found to be in renal failure with a creatinine today at 2.01.  His baseline   from 2018 was 0.64.  Unfortunately, I do not have any creatinine level in   between. Also  this morning, his potassium was elevated at 6.1.     The patient had no recent use of NSAIDs or IV contrast.     The patient describes shortness of breath. It happened suddenly on 07/04, the   patient did have chest CT scan with IV contrast on 07/07 on admission to rule   out PE.     PAST MEDICAL HISTORY:  Significant for:  1.  Hypertension.  2.  Asthma.     ALLERGIES:  REPORT ALLERGY TO PENICILLIN.     SOCIAL HISTORY:  The patient lives with his mom.     FAMILY HISTORY:  No known renal disease.     MEDICATIONS:  Reviewed.     REVIEW OF SYSTEMS:  The patient still has some orthopnea and some mild   shortness of breath, but no chest pain.  No hematuria, no dysuria.  All other   review of systems negative except outlined in the history of present illness.     PHYSICAL EXAMINATION:  GENERAL:  The patient appears well, in no apparent distress.  VITAL SIGNS:  Showed blood pressure of 146/88, heart rate was 68, respiratory   rate was 18.  HEENT:  Normocephalic, atraumatic.  Sclerae are anicteric.  Pupils are   reactive.  Nose normal.  Mucous membranes moist.  NECK:  No lymphadenopathy, no JVD, no thyroid mass.  CHEST:  Normal.  LUNGS:  Few rales at the  bases.  HEART:  S1, S2.  ABDOMEN:  Soft, nontender.  No hepatosplenomegaly.  There is no inguinal   lymphadenopathy.  EXTREMITIES:  There is trace lower extremity edema.  SKIN:  No skin rash.  NEUROLOGIC:  The patient is alert, oriented x3.     LABORATORY DATA:  His recent labs from today were reviewed.     DIAGNOSTIC STUDIES:  He had a renal ultrasound done yesterday, I reviewed the   image myself, which showed no hydronephrosis.     ASSESSMENT:  1.  Acute kidney injury.  I am not sure whether this is acute or chronic, as   there is no baseline creatinine for the last three years; however,   unfortunately the patient did receive IV contrast on 07/07 which might put the   patient at high risk for contrast-induced nephropathy.  We need to check a   creatinine daily as the peak of increased creatinine will be tomorrow.  2.  Hyperkalemia secondary to renal failure.  3.  Respiratory failure.  I do believe the patient might be volume overloaded   as indicated by high blood pressure, high BNP and chest x-ray finding;   however, there is a possibility of pneumonia as well.  4.  Anemia.  5.  Leukocytosis, most likely secondary to steroid dose he got as an outside   hospital.     PLAN:  1.  There is no acute need for renal replacement therapy.  2.  Check urine sodium, creatinine, as well as protein.  3.  I will try one dose of IV Lasix to manage respiratory failure and   uncontrolled hypertension.  4.  Daily labs.  5.  Low potassium diet.  6.  Again, we need to watch for contrast-induced nephropathy as a cause for   worsening creatinine.  7.  We will try to get old records.     PROGNOSIS:  Guarded.     Plan discussed in detail with Dr. Low.  I would like to thank you for   consulting on this very interesting case.        ______________________________  FADI NAJJAR, MD FN/SUDHIR    DD:  07/09/2021 14:33  DT:  07/09/2021 17:17    Job#:  [id number]

## 2021-07-10 NOTE — ASSESSMENT & PLAN NOTE
Secondary to decreasing renal function.  Nephrology recommendations appreciated avoid extra or supplemental potassium.

## 2021-07-10 NOTE — CARE PLAN
Problem: Mobility  Goal: Patient's capacity to carry out activities will improve  Outcome: Progressing  Note: Patient got up to chair this evening and spent an hour up out of bed. Patient a x1-2 assist.      Problem: Respiratory  Goal: Patient will achieve/maintain optimum respiratory ventilation and gas exchange  Outcome: Progressing  Note: Patient on 4L NC up from baseline. Patient get SOB with exertion. Titrating as appropriate.

## 2021-07-10 NOTE — PROGRESS NOTES
Bedside report received from SILVIO Agarwal. Call light and belongings within reach. Bed locked and in lowest position. Alarm and fall precautions in place.

## 2021-07-10 NOTE — PROGRESS NOTES
[facility] Medicine Daily Progress Note    Date of Service  7/9/2021    Chief Complaint  Nik Wasserman is a 35 y.o. male admitted 7/7/2021 with shortness of breath    Hospital Course  Nik Wasserman is a 35 y.o. male with developmental delay and asthma. Who presented 7/7/2021 as transfer from [facility] where he was seen for shortness of breath since Monday. Patient reports that he was watching fireworks on Monday, and noticed he was having some difficulty breathing. Patient has history of asthma and he thought that it may be due to smoke irritating his lungs. Patient reports that they ignored it and they're hoping that his breathing is improved, but did not. There were no associated fevers, chills, coughing. He did however develop chest pain that started the night prior to his arrival. Additionally, on questioning he reports that he does have shortness of breath when lying flat which has been worsening for the past few days. He currently denies any prior history of cardiac disease in the past. However, per his aunt at bedside, patient was diagnosed with high blood pressure which he believes may have been as long as 2 years ago which he has not been taking medications for. Patient denies further complaints. While at [facility], patient was noted to have persistent hypoxia and required BiPAP therapy. He was additionally noted to have elevated BNP for which she was given Lasix 40 mg IV and was started on nitro drip. Patient symptoms did improve, however, he is developed headaches from the nitro drip at which time it was stopped. Patient was transferred to [facility] where he remained on BiPAP in route. On arrival to [facility] ED, patient was successfully able to be transitioned to 10 L oxygen mask.     [facility] ED: HR 80-1 01, RR 24-42, //102, O2 saturations 90% on 10 L oxygen mask. WBC 12.7, Hb 12.3, CO2 17, BUN 35, CR 1.87, glucose under 34, troponin 38, BNP 9418. CXR performed with bilateral pleural  effusions and consolidations bilateral bases. CTA of the pulmonary arteries performed which is unremarkable PE, but again noted bibasilar consolidation with trace effusions. Patient was given DuoNeb's.      Interval Problem Update  7/8/2021 interval update  Patient endorses improvement in breathing mild evidence of wheezing patient got one-time dose Solu-Medrol 125 at [facility] likely contributing to leukocytosis.  Presently adjusted breathing treatments discontinued albuterol initiated Xopenex every 6 hours scheduled.  Scheduled Solu-Medrol 62.5 every 8 hours.  Lasix 40 mg IV daily.  One-time dose now.    7/9/2021:  Patient evaluated bedside continues have mild shortness of breath although oxygen demand is decreasing previous day.  Cardiology, pulmonary, nephrology recommendations appreciated.  Present discussion about whether this represents cardiac etiology versus alternative i.e. pneumonia versus asthma exacerbation versus YODIT on CKD.  Apparently upon further interview by pulmonary patient does endorse to using homemade THC solution in addition to methamphetamine abuse occasionally.  Otherwise patient denies subjective fever this point time will continue on steroids, continue her breathing treatments has got Lasix challenge from nephrology I will continue to monitor urinary output.  Repeat labs in the morning.      I have personally seen and examined the patient at bedside. I discussed the plan of care with patient and bedside RN.    Consultants/Specialty  cardiology, pulmonary and Nephrology    Code Status  Full Code    Disposition  Patient is not medically cleared.   Anticipate discharge to to home with close outpatient follow-up.  I have placed the appropriate orders for post-discharge needs.    Review of Systems  Review of Systems   Constitutional: Negative for chills, fever and weight loss.   HENT: Negative for hearing loss and sore throat.    Eyes: Negative for blurred vision and pain.   Respiratory:  Positive for shortness of breath. Negative for cough, hemoptysis and wheezing.    Cardiovascular: Positive for orthopnea and leg swelling. Negative for chest pain and palpitations.   Gastrointestinal: Negative for abdominal pain, blood in stool, constipation, diarrhea, nausea and vomiting.   Genitourinary: Negative for dysuria and hematuria.   Musculoskeletal: Negative for joint pain and myalgias.   Skin: Negative for rash.   Neurological: Negative for dizziness, loss of consciousness and weakness.        Physical Exam  Temp:  [35.8 °C (96.5 °F)-36.7 °C (98 °F)] 36.2 °C (97.2 °F)  Pulse:  [59-83] 66  Resp:  [16-22] 18  BP: (138-178)/() 147/93  SpO2:  [93 %-98 %] 98 %    Physical Exam  Vitals and nursing note reviewed.   Constitutional:       General: He is not in acute distress.     Appearance: Normal appearance. He is not ill-appearing.   HENT:      Right Ear: External ear normal.      Left Ear: External ear normal.      Nose: No congestion or rhinorrhea.      Mouth/Throat:      Mouth: Mucous membranes are moist.   Eyes:      General:         Right eye: No discharge.         Left eye: No discharge.      Extraocular Movements: Extraocular movements intact.   Cardiovascular:      Rate and Rhythm: Normal rate and regular rhythm.      Heart sounds: No murmur heard.   No gallop.    Pulmonary:      Effort: Pulmonary effort is normal.      Breath sounds: Rales (Diffuse) present. No wheezing or rhonchi.   Abdominal:      General: Abdomen is flat. Bowel sounds are normal. There is no distension.      Palpations: Abdomen is soft.      Tenderness: There is no abdominal tenderness.   Musculoskeletal:         General: No deformity. Normal range of motion.      Cervical back: No rigidity.      Right lower leg: Edema present.      Left lower leg: Edema present.   Skin:     General: Skin is warm and dry.      Coloration: Skin is not jaundiced or pale.      Findings: No bruising.   Neurological:      General: No focal deficit  present.      Mental Status: He is alert and oriented to person, place, and time.      Cranial Nerves: No cranial nerve deficit.      Motor: No weakness.   Psychiatric:         Behavior: Behavior normal.         Fluids    Intake/Output Summary (Last 24 hours) at 7/9/2021 1712  Last data filed at 7/9/2021 1444  Gross per 24 hour   Intake 480 ml   Output 400 ml   Net 80 ml       Laboratory  Recent Labs     07/07/21 2153 07/09/21  0205   WBC 12.7* 12.0*   RBC 4.20* 3.88*   HEMOGLOBIN 12.3* 11.5*   HEMATOCRIT 38.7* 36.5*   MCV 92.1 94.1   MCH 29.3 29.6   MCHC 31.8* 31.5*   RDW 44.5 45.0   PLATELETCT 276 295   MPV 10.5 10.6     Recent Labs     07/07/21 2153 07/09/21  0205   SODIUM 140 141   POTASSIUM 4.7 6.1*   CHLORIDE 112 115*   CO2 17* 16*   GLUCOSE 134* 134*   BUN 35* 50*   CREATININE 1.87* 2.01*   CALCIUM 8.4* 8.1*                   Imaging  EC-ECHOCARDIOGRAM COMPLETE W/O CONT   Final Result      US-RENAL   Final Result      Normal renal ultrasound.      OUTSIDE IMAGES-DX CHEST   Final Result      CT-CTA CHEST PULMONARY ARTERY W/ RECONS   Final Result      1.  No evidence of pulmonary embolus.      2.  Bibasilar consolidation with trace effusions.            DX-CHEST-PORTABLE (1 VIEW)   Final Result      Enlarged cardiac silhouette with small bilateral pleural effusions and bibasilar consolidation           Assessment/Plan  * Acute respiratory failure (HCC)- (present on admission)  Assessment & Plan  7/9/2021:  Continue with Xopenex breathing treatments, appreciate nephrology recommendations gentle diuresis.  Continue to titrate down oxygen as tolerated.  Empirically place patient on community-acquired antimicrobials including ceftriaxone and doxycycline.  Will reevaluate in the morning.  Continue with incentive spirometer and expiratory challenges.  Pulmonary recommendations appreciated.  ____________________________________________________________________  Patient with worsening respiratory distress since  Monday  Noted to have bilateral lower extremity edema  BNP elevated 9418 and troponin 38  Denies any prior history of cardiac disease in the past  Is reported to have uncontrolled blood pressure for possibly the past 2 years  - TTE  - Conservative IVF use  - Daily weights, I/O  - Fluid restrict 1200 CC  - Low Na diet  - Lasix 40mg IV x1  -We'll initiate metoprolol 12.5 mg twice daily  -Consider ACEi pending patient response to BB initiation and TTE for confirmation of heart failure  - O2 therapy as needed      Hyperkalemia  Assessment & Plan  Secondary to decreasing renal function.  Nephrology recommendations appreciated avoid extra or supplemental potassium.    Essential hypertension  Assessment & Plan  Reported to have history of hypertension per aunt at bedside  -We'll initiate metoprolol 12.5 mg twice daily for hypertension and for possible CHF  -IV antihypertensives as needed  -Adjust p.o. medications as necessary    Hyperglycemia- (present on admission)  Assessment & Plan  A1c  ISS    Leukocytosis- (present on admission)  Assessment & Plan  7/9/2021:  Likely multifactorial patient did receive a one-time dose of Solu-Medrol 125 mg prior to transfer from [facility].  This likely represents demargination subsequently resulting in leukocytosis.  Clinically unclear as to whether there are signs of infection.  Patient does have prominent effusions bilaterally lower lung bases.  We will continue to moderate empirically place patient on community-acquired pneumonia coverage.    procal and UA    YODIT (acute kidney injury) (HCC)- (present on admission)  Assessment & Plan  Holding off on urine studies for now due to lasix administration, order when not on lasix  Holding off on IVF for now due to respiratory status  Renal US  7/9/2021:  Nephrology consultation placed appreciate the recommendations.  Patient will have 60 mg Lasix challenge at present.  Continue to monitor kidney function.  Should be noted the patient did  receive contrast several days ago for CTA for evaluation of pulmonary embolus likely this is also resulting in uptrending creatinine at present.  Continue to monitor potassium    Elevated troponin- (present on admission)  Assessment & Plan  Tele monitor  Repeat trop    Elevated brain natriuretic peptide (BNP) level- (present on admission)  Assessment & Plan  Plan as above       VTE prophylaxis: SCDs/TEDs, enoxaparin ppx and heparin ppx    I have performed a physical exam and reviewed and updated ROS and Plan today (7/9/2021). In review of yesterday's note (7/8/2021), there are no changes except as documented above.

## 2021-07-10 NOTE — PROGRESS NOTES
Bedside report received. Bed locked and in low position, call light within reach. Hourly rounding in place. No further needs at this time. Family at bedside.

## 2021-07-10 NOTE — PROGRESS NOTES
Nephrology Daily Progress Note    Date of Service  7/10/2021    Chief Complaint  35 y.o. male admitted 7/7/2021 with resp failure, YODIT, hyperkalemia, PNA    Interval Problem Update  Pt feels better today, SOB is better  Good UO with Lasix    Review of Systems  Review of Systems   Constitutional: Negative for chills, fever and malaise/fatigue.   Respiratory: Positive for shortness of breath. Negative for cough.    Cardiovascular: Negative for chest pain and leg swelling.   Gastrointestinal: Negative for nausea and vomiting.   Genitourinary: Negative for dysuria, frequency and urgency.   All other systems reviewed and are negative.       Physical Exam  Temp:  [36.2 °C (97.2 °F)-37.2 °C (98.9 °F)] 36.3 °C (97.3 °F)  Pulse:  [60-95] 60  Resp:  [16-18] 16  BP: (140-174)/() 165/105  SpO2:  [92 %-98 %] 93 %    Physical Exam  Vitals and nursing note reviewed.   Constitutional:       General: He is awake.      Appearance: He is not ill-appearing.   HENT:      Head: Normocephalic and atraumatic.      Right Ear: External ear normal.      Left Ear: External ear normal.      Nose: Nose normal.      Mouth/Throat:      Pharynx: No oropharyngeal exudate or posterior oropharyngeal erythema.   Eyes:      General:         Right eye: No discharge.         Left eye: No discharge.      Conjunctiva/sclera: Conjunctivae normal.   Cardiovascular:      Rate and Rhythm: Normal rate and regular rhythm.   Pulmonary:      Effort: Pulmonary effort is normal. No respiratory distress.      Breath sounds: Rhonchi present. No wheezing.   Abdominal:      General: Abdomen is flat. Bowel sounds are normal.   Musculoskeletal:         General: No tenderness.      Cervical back: No rigidity. No muscular tenderness.      Right lower leg: Edema present.      Left lower leg: Edema present.   Skin:     General: Skin is warm and dry.      Coloration: Skin is not jaundiced.      Findings: No lesion or rash.   Neurological:      General: No focal deficit  present.      Mental Status: He is alert and oriented to person, place, and time. Mental status is at baseline.   Psychiatric:         Mood and Affect: Mood normal.         Behavior: Behavior normal.         Thought Content: Thought content normal.         Fluids    Intake/Output Summary (Last 24 hours) at 7/10/2021 1346  Last data filed at 7/10/2021 0523  Gross per 24 hour   Intake 240 ml   Output 1700 ml   Net -1460 ml       Laboratory  Recent Labs     07/07/21 2153 07/09/21 0205 07/10/21  0141   WBC 12.7* 12.0* 10.1   RBC 4.20* 3.88* 3.96*   HEMOGLOBIN 12.3* 11.5* 11.7*   HEMATOCRIT 38.7* 36.5* 37.2*   MCV 92.1 94.1 93.9   MCH 29.3 29.6 29.5   MCHC 31.8* 31.5* 31.5*   RDW 44.5 45.0 44.4   PLATELETCT 276 295 299   MPV 10.5 10.6 11.1     Recent Labs     07/07/21 2153 07/09/21 0205 07/10/21  0141   SODIUM 140 141 138   POTASSIUM 4.7 6.1* 5.7*   CHLORIDE 112 115* 111   CO2 17* 16* 18*   GLUCOSE 134* 134* 131*   BUN 35* 50* 61*   CREATININE 1.87* 2.01* 1.86*   CALCIUM 8.4* 8.1* 8.3*         Recent Labs     07/07/21 2153   NTPROBNP 9418*           Imaging  EC-ECHOCARDIOGRAM COMPLETE W/O CONT   Final Result      US-RENAL   Final Result      Normal renal ultrasound.      OUTSIDE IMAGES-DX CHEST   Final Result      CT-CTA CHEST PULMONARY ARTERY W/ RECONS   Final Result      1.  No evidence of pulmonary embolus.      2.  Bibasilar consolidation with trace effusions.            DX-CHEST-PORTABLE (1 VIEW)   Final Result      Enlarged cardiac silhouette with small bilateral pleural effusions and bibasilar consolidation            Assessment/Plan  1 YODIT  2 Hyperkalemia  3 resp failure  4 volume overload  5 PNA  6 HTN  Plan  no acute need for HD  Lasix as needed for SOB  Renal diet  Daily labs  Renal dose all meds  Avoid nephrotoxins  Prognosis guarded.  D/W Dr Low
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
## 2021-07-11 VITALS
WEIGHT: 315 LBS | DIASTOLIC BLOOD PRESSURE: 98 MMHG | BODY MASS INDEX: 41.75 KG/M2 | OXYGEN SATURATION: 91 % | HEIGHT: 73 IN | RESPIRATION RATE: 16 BRPM | SYSTOLIC BLOOD PRESSURE: 157 MMHG | TEMPERATURE: 98.6 F | HEART RATE: 70 BPM

## 2021-07-11 LAB
ANION GAP SERPL CALC-SCNC: 9 MMOL/L (ref 7–16)
BUN SERPL-MCNC: 61 MG/DL (ref 8–22)
CALCIUM SERPL-MCNC: 8.2 MG/DL (ref 8.5–10.5)
CHLORIDE SERPL-SCNC: 111 MMOL/L (ref 96–112)
CO2 SERPL-SCNC: 19 MMOL/L (ref 20–33)
CREAT SERPL-MCNC: 1.59 MG/DL (ref 0.5–1.4)
GLUCOSE BLD-MCNC: 80 MG/DL (ref 65–99)
GLUCOSE BLD-MCNC: 80 MG/DL (ref 65–99)
GLUCOSE SERPL-MCNC: 96 MG/DL (ref 65–99)
POTASSIUM SERPL-SCNC: 5.2 MMOL/L (ref 3.6–5.5)
SODIUM SERPL-SCNC: 139 MMOL/L (ref 135–145)

## 2021-07-11 PROCEDURE — 99232 SBSQ HOSP IP/OBS MODERATE 35: CPT | Performed by: INTERNAL MEDICINE

## 2021-07-11 PROCEDURE — 80048 BASIC METABOLIC PNL TOTAL CA: CPT

## 2021-07-11 PROCEDURE — 36415 COLL VENOUS BLD VENIPUNCTURE: CPT

## 2021-07-11 PROCEDURE — 94640 AIRWAY INHALATION TREATMENT: CPT

## 2021-07-11 PROCEDURE — 700101 HCHG RX REV CODE 250: Performed by: STUDENT IN AN ORGANIZED HEALTH CARE EDUCATION/TRAINING PROGRAM

## 2021-07-11 PROCEDURE — 99239 HOSP IP/OBS DSCHRG MGMT >30: CPT | Performed by: STUDENT IN AN ORGANIZED HEALTH CARE EDUCATION/TRAINING PROGRAM

## 2021-07-11 PROCEDURE — 700111 HCHG RX REV CODE 636 W/ 250 OVERRIDE (IP): Performed by: STUDENT IN AN ORGANIZED HEALTH CARE EDUCATION/TRAINING PROGRAM

## 2021-07-11 PROCEDURE — 94669 MECHANICAL CHEST WALL OSCILL: CPT

## 2021-07-11 PROCEDURE — A9270 NON-COVERED ITEM OR SERVICE: HCPCS | Performed by: STUDENT IN AN ORGANIZED HEALTH CARE EDUCATION/TRAINING PROGRAM

## 2021-07-11 PROCEDURE — 700102 HCHG RX REV CODE 250 W/ 637 OVERRIDE(OP): Performed by: STUDENT IN AN ORGANIZED HEALTH CARE EDUCATION/TRAINING PROGRAM

## 2021-07-11 PROCEDURE — 82962 GLUCOSE BLOOD TEST: CPT | Mod: 91

## 2021-07-11 RX ORDER — AMLODIPINE BESYLATE 10 MG/1
10 TABLET ORAL
Status: DISCONTINUED | OUTPATIENT
Start: 2021-07-11 | End: 2021-07-11 | Stop reason: HOSPADM

## 2021-07-11 RX ORDER — LEVALBUTEROL INHALATION SOLUTION 0.63 MG/3ML
0.63 SOLUTION RESPIRATORY (INHALATION)
Status: DISCONTINUED | OUTPATIENT
Start: 2021-07-11 | End: 2021-07-11 | Stop reason: HOSPADM

## 2021-07-11 RX ORDER — AMLODIPINE BESYLATE 10 MG/1
10 TABLET ORAL DAILY
Qty: 30 TABLET | Refills: 1 | Status: SHIPPED | OUTPATIENT
Start: 2021-07-12

## 2021-07-11 RX ORDER — FUROSEMIDE 10 MG/ML
40 INJECTION INTRAMUSCULAR; INTRAVENOUS ONCE
Status: COMPLETED | OUTPATIENT
Start: 2021-07-11 | End: 2021-07-11

## 2021-07-11 RX ORDER — CARVEDILOL 25 MG/1
25 TABLET ORAL 2 TIMES DAILY WITH MEALS
Qty: 60 TABLET | Refills: 1 | Status: SHIPPED | OUTPATIENT
Start: 2021-07-11

## 2021-07-11 RX ADMIN — DOCUSATE SODIUM 50 MG AND SENNOSIDES 8.6 MG 2 TABLET: 8.6; 5 TABLET, FILM COATED ORAL at 05:27

## 2021-07-11 RX ADMIN — HEPARIN SODIUM 5000 UNITS: 5000 INJECTION, SOLUTION INTRAVENOUS; SUBCUTANEOUS at 14:58

## 2021-07-11 RX ADMIN — FUROSEMIDE 40 MG: 10 INJECTION, SOLUTION INTRAVENOUS at 11:23

## 2021-07-11 RX ADMIN — HEPARIN SODIUM 5000 UNITS: 5000 INJECTION, SOLUTION INTRAVENOUS; SUBCUTANEOUS at 05:26

## 2021-07-11 RX ADMIN — CARVEDILOL 25 MG: 25 TABLET, FILM COATED ORAL at 17:47

## 2021-07-11 RX ADMIN — AMLODIPINE BESYLATE 10 MG: 10 TABLET ORAL at 11:23

## 2021-07-11 RX ADMIN — CARVEDILOL 25 MG: 25 TABLET, FILM COATED ORAL at 08:39

## 2021-07-11 RX ADMIN — CEFTRIAXONE SODIUM 2 G: 10 INJECTION, POWDER, FOR SOLUTION INTRAVENOUS at 05:26

## 2021-07-11 RX ADMIN — LEVALBUTEROL HYDROCHLORIDE 0.63 MG: 0.63 SOLUTION RESPIRATORY (INHALATION) at 07:32

## 2021-07-11 ASSESSMENT — COPD QUESTIONNAIRES
DURING THE PAST 4 WEEKS HOW MUCH DID YOU FEEL SHORT OF BREATH: SOME OF THE TIME
HAVE YOU SMOKED AT LEAST 100 CIGARETTES IN YOUR ENTIRE LIFE: NO/DON'T KNOW
DO YOU EVER COUGH UP ANY MUCUS OR PHLEGM?: NO/ONLY WITH OCCASIONAL COLDS OR INFECTIONS
COPD SCREENING SCORE: 3

## 2021-07-11 ASSESSMENT — ENCOUNTER SYMPTOMS
SHORTNESS OF BREATH: 0
FEVER: 0
CHILLS: 0
COUGH: 0
VOMITING: 0
NAUSEA: 0

## 2021-07-11 NOTE — PROGRESS NOTES
Nephrology Daily Progress Note    Date of Service  7/11/2021    Chief Complaint  35 y.o. male admitted 7/7/2021 with resp failure, YODIT, hyperkalemia, PNA    Interval Problem Update  Pt feels better today, SOB is better  Good UO with Lasix  7/11 pt is doing much better, no SOB  Review of Systems  Review of Systems   Constitutional: Negative for chills, fever and malaise/fatigue.   Respiratory: Negative for cough and shortness of breath.    Cardiovascular: Negative for chest pain and leg swelling.   Gastrointestinal: Negative for nausea and vomiting.   Genitourinary: Negative for dysuria, frequency and urgency.        Physical Exam  Temp:  [36.1 °C (97 °F)-36.2 °C (97.2 °F)] 36.1 °C (97 °F)  Pulse:  [53-75] 75  Resp:  [16-18] 18  BP: (139-167)/() 156/104  SpO2:  [92 %-98 %] 92 %    Physical Exam  Vitals and nursing note reviewed.   Constitutional:       General: He is not in acute distress.     Appearance: He is not ill-appearing.   HENT:      Head: Normocephalic and atraumatic.      Right Ear: External ear normal.      Left Ear: External ear normal.      Nose: Nose normal.   Eyes:      General:         Right eye: No discharge.         Left eye: No discharge.      Conjunctiva/sclera: Conjunctivae normal.   Cardiovascular:      Rate and Rhythm: Normal rate and regular rhythm.      Heart sounds: No murmur heard.     Pulmonary:      Effort: Pulmonary effort is normal. No respiratory distress.      Breath sounds: Normal breath sounds. No wheezing.   Musculoskeletal:         General: No tenderness or deformity.      Right lower leg: No edema.      Left lower leg: No edema.   Skin:     General: Skin is warm.   Neurological:      General: No focal deficit present.      Mental Status: He is alert and oriented to person, place, and time.   Psychiatric:         Mood and Affect: Mood normal.         Behavior: Behavior normal.         Fluids    Intake/Output Summary (Last 24 hours) at 7/11/2021 1322  Last data filed at  7/11/2021 1100  Gross per 24 hour   Intake 1280 ml   Output 350 ml   Net 930 ml       Laboratory  Recent Labs     07/09/21  0205 07/10/21  0141   WBC 12.0* 10.1   RBC 3.88* 3.96*   HEMOGLOBIN 11.5* 11.7*   HEMATOCRIT 36.5* 37.2*   MCV 94.1 93.9   MCH 29.6 29.5   MCHC 31.5* 31.5*   RDW 45.0 44.4   PLATELETCT 295 299   MPV 10.6 11.1     Recent Labs     07/09/21  0205 07/10/21  0141 07/11/21  0236   SODIUM 141 138 139   POTASSIUM 6.1* 5.7* 5.2   CHLORIDE 115* 111 111   CO2 16* 18* 19*   GLUCOSE 134* 131* 96   BUN 50* 61* 61*   CREATININE 2.01* 1.86* 1.59*   CALCIUM 8.1* 8.3* 8.2*         No results for input(s): NTPROBNP in the last 72 hours.        Imaging  EC-ECHOCARDIOGRAM COMPLETE W/O CONT   Final Result      US-RENAL   Final Result      Normal renal ultrasound.      OUTSIDE IMAGES-DX CHEST   Final Result      CT-CTA CHEST PULMONARY ARTERY W/ RECONS   Final Result      1.  No evidence of pulmonary embolus.      2.  Bibasilar consolidation with trace effusions.            DX-CHEST-PORTABLE (1 VIEW)   Final Result      Enlarged cardiac silhouette with small bilateral pleural effusions and bibasilar consolidation            Assessment/Plan  1 YODIT:improving  2 Hyperkalemia:resolved  3 resp failure  4 volume overload:better  5 PNA  6 HTN  Plan  no need for HD  Renal diet  Renal dose all meds  Avoid nephrotoxins  We will sign off the case, please call if needed  D/W Dr Low

## 2021-07-11 NOTE — DISCHARGE INSTRUCTIONS
Discharge Instructions    Discharged to home by car with relative. Discharged via wheelchair, hospital escort: Yes.  Special equipment needed: Not Applicable    Be sure to schedule a follow-up appointment with your primary care doctor or any specialists as instructed.     Discharge Plan:   Diet Plan: Discussed  Activity Level: Discussed  Confirmed Follow up Appointment: Patient to Call and Schedule Appointment  Confirmed Symptoms Management: Discussed  Medication Reconciliation Updated: Yes    I understand that a diet low in cholesterol, fat, and sodium is recommended for good health. Unless I have been given specific instructions below for another diet, I accept this instruction as my diet prescription.   Other diet: Regular    Special Instructions: None    · Is patient discharged on Warfarin / Coumadin?   No     Depression / Suicide Risk    As you are discharged from this [facility] facility, it is important to learn how to keep safe from harming yourself.    Recognize the warning signs:  · Abrupt changes in personality, positive or negative- including increase in energy   · Giving away possessions  · Change in eating patterns- significant weight changes-  positive or negative  · Change in sleeping patterns- unable to sleep or sleeping all the time   · Unwillingness or inability to communicate  · Depression  · Unusual sadness, discouragement and loneliness  · Talk of wanting to die  · Neglect of personal appearance   · Rebelliousness- reckless behavior  · Withdrawal from people/activities they love  · Confusion- inability to concentrate     If you or a loved one observes any of these behaviors or has concerns about self-harm, here's what you can do:  · Talk about it- your feelings and reasons for harming yourself  · Remove any means that you might use to hurt yourself (examples: pills, rope, extension cords, firearm)  · Get professional help from the community (Mental Health, Substance Abuse, psychological  counseling)  · Do not be alone:Call your Safe Contact- someone whom you trust who will be there for you.  · Call your local CRISIS HOTLINE 901-8629 or 376-058-3609  · Call your local Children's Mobile Crisis Response Team [place] (306) 522-7198 or www.[employer]  · Call the toll free National Suicide Prevention Hotlines   · National Suicide Prevention Lifeline 929-905-OYSC (3191)  · [employer] Network 800-SUICIDE (617-9688)      Hypertension, Adult  Hypertension is another name for high blood pressure. High blood pressure forces your heart to work harder to pump blood. This can cause problems over time.  There are two numbers in a blood pressure reading. There is a top number (systolic) over a bottom number (diastolic). It is best to have a blood pressure that is below 120/80. Healthy choices can help lower your blood pressure, or you may need medicine to help lower it.  What are the causes?  The cause of this condition is not known. Some conditions may be related to high blood pressure.  What increases the risk?  · Smoking.  · Having type 2 diabetes mellitus, high cholesterol, or both.  · Not getting enough exercise or physical activity.  · Being overweight.  · Having too much fat, sugar, calories, or salt (sodium) in your diet.  · Drinking too much alcohol.  · Having long-term (chronic) kidney disease.  · Having a family history of high blood pressure.  · Age. Risk increases with age.  · Race. You may be at higher risk if you are .  · Gender. Men are at higher risk than women before age 45. After age 65, women are at higher risk than men.  · Having obstructive sleep apnea.  · Stress.  What are the signs or symptoms?  · High blood pressure may not cause symptoms. Very high blood pressure (hypertensive crisis) may cause:  ? Headache.  ? Feelings of worry or nervousness (anxiety).  ? Shortness of breath.  ? Nosebleed.  ? A feeling of being sick to your stomach (nausea).  ? Throwing  up (vomiting).  ? Changes in how you see.  ? Very bad chest pain.  ? Seizures.  How is this treated?  · This condition is treated by making healthy lifestyle changes, such as:  ? Eating healthy foods.  ? Exercising more.  ? Drinking less alcohol.  · Your health care provider may prescribe medicine if lifestyle changes are not enough to get your blood pressure under control, and if:  ? Your top number is above 130.  ? Your bottom number is above 80.  · Your personal target blood pressure may vary.  Follow these instructions at home:  Eating and drinking    · If told, follow the DASH eating plan. To follow this plan:  ? Fill one half of your plate at each meal with fruits and vegetables.  ? Fill one fourth of your plate at each meal with whole grains. Whole grains include whole-wheat pasta, brown rice, and whole-grain bread.  ? Eat or drink low-fat dairy products, such as skim milk or low-fat yogurt.  ? Fill one fourth of your plate at each meal with low-fat (lean) proteins. Low-fat proteins include fish, chicken without skin, eggs, beans, and tofu.  ? Avoid fatty meat, cured and processed meat, or chicken with skin.  ? Avoid pre-made or processed food.  · Eat less than 1,500 mg of salt each day.  · Do not drink alcohol if:  ? Your doctor tells you not to drink.  ? You are pregnant, may be pregnant, or are planning to become pregnant.  · If you drink alcohol:  ? Limit how much you use to:  § 0-1 drink a day for women.  § 0-2 drinks a day for men.  ? Be aware of how much alcohol is in your drink. In the [place] one drink equals one 12 oz bottle of beer (355 mL), one 5 oz glass of wine (148 mL), or one 1½ oz glass of hard liquor (44 mL).  Lifestyle    · Work with your doctor to stay at a healthy weight or to lose weight. Ask your doctor what the best weight is for you.  · Get at least 30 minutes of exercise most days of the week. This may include walking, swimming, or biking.  · Get at least 30 minutes of exercise that  strengthens your muscles (resistance exercise) at least 3 days a week. This may include lifting weights or doing Pilates.  · Do not use any products that contain nicotine or tobacco, such as cigarettes, e-cigarettes, and chewing tobacco. If you need help quitting, ask your doctor.  · Check your blood pressure at home as told by your doctor.  · Keep all follow-up visits as told by your doctor. This is important.  Medicines  · Take over-the-counter and prescription medicines only as told by your doctor. Follow directions carefully.  · Do not skip doses of blood pressure medicine. The medicine does not work as well if you skip doses. Skipping doses also puts you at risk for problems.  · Ask your doctor about side effects or reactions to medicines that you should watch for.  Contact a doctor if you:  · Think you are having a reaction to the medicine you are taking.  · Have headaches that keep coming back (recurring).  · Feel dizzy.  · Have swelling in your ankles.  · Have trouble with your vision.  Get help right away if you:  · Get a very bad headache.  · Start to feel mixed up (confused).  · Feel weak or numb.  · Feel faint.  · Have very bad pain in your:  ? Chest.  ? Belly (abdomen).  · Throw up more than once.  · Have trouble breathing.  Summary  · Hypertension is another name for high blood pressure.  · High blood pressure forces your heart to work harder to pump blood.  · For most people, a normal blood pressure is less than 120/80.  · Making healthy choices can help lower blood pressure. If your blood pressure does not get lower with healthy choices, you may need to take medicine.  This information is not intended to replace advice given to you by your health care provider. Make sure you discuss any questions you have with your health care provider.  Document Released: 06/05/2009 Document Revised: 08/28/2019 Document Reviewed: 08/28/2019  [employer] Patient Education © 2020 [employer]    Amlodipine tablets  What  is this medicine?  AMLODIPINE (am MADIHA marvin chuck) is a calcium-channel blocker. It affects the amount of calcium found in your heart and muscle cells. This relaxes your blood vessels, which can reduce the amount of work the heart has to do. This medicine is used to lower high blood pressure. It is also used to prevent chest pain.  This medicine may be used for other purposes; ask your health care provider or pharmacist if you have questions.  COMMON BRAND NAME(S): Norvasc  What should I tell my health care provider before I take this medicine?  They need to know if you have any of these conditions:  · heart disease  · liver disease  · an unusual or allergic reaction to amlodipine, other medicines, foods, dyes, or preservatives  · pregnant or trying to get pregnant  · breast-feeding  How should I use this medicine?  Take this medicine by mouth with a glass of water. Follow the directions on the prescription label. You can take it with or without food. If it upsets your stomach, take it with food. Take your medicine at regular intervals. Do not take it more often than directed. Do not stop taking except on your doctor's advice.  Talk to your pediatrician regarding the use of this medicine in children. While this drug may be prescribed for children as young as 6 years for selected conditions, precautions do apply.  Patients over 65 years of age may have a stronger reaction and need a smaller dose.  Overdosage: If you think you have taken too much of this medicine contact a poison control center or emergency room at once.  NOTE: This medicine is only for you. Do not share this medicine with others.  What if I miss a dose?  If you miss a dose, take it as soon as you can. If it is almost time for your next dose, take only that dose. Do not take double or extra doses.  What may interact with this medicine?  Do not take this medicine with any of the following medications:  · tranylcypromine  This medicine may also interact  with the following medications:  · clarithromycin  · cyclosporine  · diltiazem  · itraconazole  · simvastatin  · tacrolimus  This list may not describe all possible interactions. Give your health care provider a list of all the medicines, herbs, non-prescription drugs, or dietary supplements you use. Also tell them if you smoke, drink alcohol, or use illegal drugs. Some items may interact with your medicine.  What should I watch for while using this medicine?  Visit your healthcare professional for regular checks on your progress. Check your blood pressure as directed. Ask your healthcare professional what your blood pressure should be and when you should contact him or her.  Do not treat yourself for coughs, colds, or pain while you are using this medicine without asking your healthcare professional for advice. Some medicines may increase your blood pressure.  You may get dizzy. Do not drive, use machinery, or do anything that needs mental alertness until you know how this medicine affects you. Do not stand or sit up quickly, especially if you are an older patient. This reduces the risk of dizzy or fainting spells. Avoid alcoholic drinks; they can make you dizzier.  What side effects may I notice from receiving this medicine?  Side effects that you should report to your doctor or health care professional as soon as possible:  · allergic reactions like skin rash, itching or hives; swelling of the face, lips, or tongue  · fast, irregular heartbeat  · signs and symptoms of low blood pressure like dizziness; feeling faint or lightheaded, falls; unusually weak or tired  · swelling of ankles, feet, hands  Side effects that usually do not require medical attention (report these to your doctor or health care professional if they continue or are bothersome):  · dry mouth  · facial flushing  · headache  · stomach pain  · tiredness  This list may not describe all possible side effects. Call your doctor for medical advice  about side effects. You may report side effects to FDA at 0-286-UTK-4599.  Where should I keep my medicine?  Keep out of the reach of children.  Store at room temperature between 59 and 86 degrees F (15 and 30 degrees C).  Throw away any unused medicine after the expiration date.  NOTE: This sheet is a summary. It may not cover all possible information. If you have questions about this medicine, talk to your doctor, pharmacist, or health care provider.  © 2020 Elsevier/[employer] (2019-07-12 15:07:10)    Carvedilol tablets  What is this medicine?  CARVEDILOL (SABRINA ve dil ol) is a beta-blocker. Beta-blockers reduce the workload on the heart and help it to beat more regularly. This medicine is used to treat high blood pressure and heart failure.  This medicine may be used for other purposes; ask your health care provider or pharmacist if you have questions.  COMMON BRAND NAME(S): Coreg  What should I tell my health care provider before I take this medicine?  They need to know if you have any of these conditions:  · circulation problems  · diabetes  · history of heart attack or heart disease  · liver disease  · lung or breathing disease, like asthma or emphysema  · pheochromocytoma  · slow or irregular heartbeat  · thyroid disease  · an unusual or allergic reaction to carvedilol, other beta-blockers, medicines, foods, dyes, or preservatives  · pregnant or trying to get pregnant  · breast-feeding  How should I use this medicine?  Take this medicine by mouth with a glass of water. Follow the directions on the prescription label. It is best to take the tablets with food. Take your doses at regular intervals. Do not take your medicine more often than directed. Do not stop taking except on the advice of your doctor or health care professional.  Talk to your pediatrician regarding the use of this medicine in children. Special care may be needed.  Overdosage: If you think you have taken too much of this medicine contact a  poison control center or emergency room at once.  NOTE: This medicine is only for you. Do not share this medicine with others.  What if I miss a dose?  If you miss a dose, take it as soon as you can. If it is almost time for your next dose, take only that dose. Do not take double or extra doses.  What may interact with this medicine?  This medicine may interact with the following medications:  · certain medicines for blood pressure, heart disease, irregular heart beat  · certain medicines for depression, like fluoxetine or paroxetine  · certain medicines for diabetes, like glipizide or glyburide  · cimetidine  · clonidine  · cyclosporine  · digoxin  · MAOIs like Carbex, Eldepryl, Marplan, Nardil, and Parnate  · reserpine  · rifampin  This list may not describe all possible interactions. Give your health care provider a list of all the medicines, herbs, non-prescription drugs, or dietary supplements you use. Also tell them if you smoke, drink alcohol, or use illegal drugs. Some items may interact with your medicine.  What should I watch for while using this medicine?  Check your heart rate and blood pressure regularly while you are taking this medicine. Ask your doctor or health care professional what your heart rate and blood pressure should be, and when you should contact him or her. Do not stop taking this medicine suddenly. This could lead to serious heart-related effects.  Contact your doctor or health care professional if you have difficulty breathing while taking this drug.  Check your weight daily. Ask your doctor or health care professional when you should notify him/her of any weight gain.  You may get drowsy or dizzy. Do not drive, use machinery, or do anything that requires mental alertness until you know how this medicine affects you. To reduce the risk of dizzy or fainting spells, do not sit or stand up quickly. Alcohol can make you more drowsy, and increase flushing and rapid heartbeats. Avoid  alcoholic drinks.  This medicine may increase blood sugar. Ask your healthcare provider if changes in diet or medicines are needed if you have diabetes.  If you are going to have surgery, tell your doctor or health care professional that you are taking this medicine.  What side effects may I notice from receiving this medicine?  Side effects that you should report to your doctor or health care professional as soon as possible:  · allergic reactions like skin rash, itching or hives, swelling of the face, lips, or tongue  · breathing problems  · dark urine  · irregular heartbeat  ·   signs and symptoms of high blood sugar such as being more thirsty or hungry or having to urinate more than normal. You may also feel very tired or have blurry vision.  · swollen legs or ankles  · vomiting  · yellowing of the eyes or skin  Side effects that usually do not require medical attention (report to your doctor or health care professional if they continue or are bothersome):  · change in sex drive or performance  · diarrhea  · dry eyes (especially if wearing contact lenses)  · dry, itching skin  · headache  · nausea  · unusually tired  This list may not describe all possible side effects. Call your doctor for medical advice about side effects. You may report side effects to FDA at 8-814-FDA-3843.  Where should I keep my medicine?  Keep out of the reach of children.  Store at room temperature below 30 degrees C (86 degrees F). Protect from moisture. Keep container tightly closed. Throw away any unused medicine after the expiration date.  NOTE: This sheet is a summary. It may not cover all possible information. If you have questions about this medicine, talk to your doctor, pharmacist, or health care provider.  © 2020 [employer] (2019-10-09 09:13:57)
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
## 2021-07-11 NOTE — CARE PLAN
Problem: Respiratory  Goal: Patient will achieve/maintain optimum respiratory ventilation and gas exchange  Outcome: Progressing     Problem: Knowledge Deficit - Standard  Goal: Patient and family/care givers will demonstrate understanding of plan of care, disease process/condition, diagnostic tests and medications  Outcome: Met   The patient is Stable - Low risk of patient condition declining or worsening    Shift Goals  Clinical Goals: Oxygenation  Patient Goals: Oxygenation    Progress made toward(s) clinical / shift goals:  Weaning oxygen, IS in use, dose of lasix given    Patient is not progressing towards the following goals:

## 2021-07-11 NOTE — CARE PLAN
Problem: Mobility  Goal: Patient's capacity to carry out activities will improve  Outcome: Progressing  Note: Patient out of bed for meals and bathroom. Increasingly getting back to baseline mobility. Patient stable on feet.      Problem: Respiratory  Goal: Patient will achieve/maintain optimum respiratory ventilation and gas exchange  Outcome: Progressing  Note: Patient able to tolerate oxygen weening. Will continue to titrate.

## 2021-07-12 NOTE — HEART FAILURE PROGRAM
Per Dr. Low's discharge summary on 7/11/21, also per my discussion with him last Friday, HF is ruled out as a diagnosis for this patient. Therefore, a seven day HF f/u appt and heart failure education are not indicated.    Attending provider will need to clearly state in note that HF is ruled out, or patient will still code for it.    Thank you, Sudha Cardio RN Navigator [id number]
